# Patient Record
Sex: MALE | Race: WHITE | NOT HISPANIC OR LATINO | Employment: OTHER | URBAN - METROPOLITAN AREA
[De-identification: names, ages, dates, MRNs, and addresses within clinical notes are randomized per-mention and may not be internally consistent; named-entity substitution may affect disease eponyms.]

---

## 2016-05-27 LAB — CRC RECOMMENDATION EXT: NORMAL

## 2017-08-31 ENCOUNTER — HISTORICAL (OUTPATIENT)
Dept: ADMINISTRATIVE | Facility: HOSPITAL | Age: 72
End: 2017-08-31

## 2018-03-27 ENCOUNTER — HISTORICAL (OUTPATIENT)
Dept: ADMINISTRATIVE | Facility: HOSPITAL | Age: 73
End: 2018-03-27

## 2018-03-27 LAB
CHOLEST SERPL-MCNC: 194 MG/DL (ref 0–200)
CHOLEST/HDLC SERPL: 2.7 {RATIO}
CREAT/UREA NIT SERPL: 14.4
GGT SERPL-CCNC: 11 UNIT/L (ref 5–85)
HDLC SERPL-MCNC: 71 MG/DL (ref 35–60)
LDH SERPL-CCNC: 185 UNIT/L (ref 140–271)
LDLC SERPL CALC-MCNC: 75 MG/DL (ref 0–129)
TRIGL SERPL-MCNC: 126 MG/DL (ref 30–150)
VLDLC SERPL CALC-MCNC: 25.2 MG/DL

## 2018-09-19 ENCOUNTER — HISTORICAL (OUTPATIENT)
Dept: ADMINISTRATIVE | Facility: HOSPITAL | Age: 73
End: 2018-09-19

## 2018-10-18 ENCOUNTER — HISTORICAL (OUTPATIENT)
Dept: ADMINISTRATIVE | Facility: HOSPITAL | Age: 73
End: 2018-10-18

## 2018-10-23 ENCOUNTER — HISTORICAL (OUTPATIENT)
Dept: RESPIRATORY THERAPY | Facility: HOSPITAL | Age: 73
End: 2018-10-23

## 2018-12-18 ENCOUNTER — HISTORICAL (OUTPATIENT)
Dept: ADMINISTRATIVE | Facility: HOSPITAL | Age: 73
End: 2018-12-18

## 2019-04-03 ENCOUNTER — HISTORICAL (OUTPATIENT)
Dept: ADMINISTRATIVE | Facility: HOSPITAL | Age: 74
End: 2019-04-03

## 2020-06-04 ENCOUNTER — HISTORICAL (OUTPATIENT)
Dept: ADMINISTRATIVE | Facility: HOSPITAL | Age: 75
End: 2020-06-04

## 2020-06-04 LAB
BUN SERPL-MCNC: 16 MG/DL (ref 7–18)
CALCIUM SERPL-MCNC: 9.4 MG/DL (ref 8.5–10)
CHLORIDE SERPL-SCNC: 107 MMOL/L (ref 98–107)
CO2 SERPL-SCNC: 27 MMOL/L (ref 21–32)
CREAT SERPL-MCNC: 1.21 MG/DL (ref 0.6–1.3)
CREAT/UREA NIT SERPL: 13.2
GLUCOSE SERPL-MCNC: 117 MG/DL (ref 74–106)
POTASSIUM SERPL-SCNC: 4 MMOL/L (ref 3.5–5.1)
SODIUM SERPL-SCNC: 143 MMOL/L (ref 136–145)

## 2021-01-08 ENCOUNTER — HISTORICAL (OUTPATIENT)
Dept: HEMATOLOGY/ONCOLOGY | Facility: CLINIC | Age: 76
End: 2021-01-08

## 2021-04-08 ENCOUNTER — HISTORICAL (OUTPATIENT)
Dept: HEMATOLOGY/ONCOLOGY | Facility: CLINIC | Age: 76
End: 2021-04-08

## 2021-07-09 ENCOUNTER — HISTORICAL (OUTPATIENT)
Dept: HEMATOLOGY/ONCOLOGY | Facility: CLINIC | Age: 76
End: 2021-07-09

## 2022-02-04 ENCOUNTER — HISTORICAL (OUTPATIENT)
Dept: HEMATOLOGY/ONCOLOGY | Facility: CLINIC | Age: 77
End: 2022-02-04

## 2022-02-04 LAB
ABS NEUT (OLG): 3.42 (ref 2.1–9.2)
ALBUMIN SERPL-MCNC: 4 G/DL (ref 3.4–4.8)
ALBUMIN/GLOB SERPL: 1.8 {RATIO} (ref 1.1–2)
ALP SERPL-CCNC: 68 U/L (ref 40–150)
ALT SERPL-CCNC: 20 U/L (ref 0–55)
AST SERPL-CCNC: 20 U/L (ref 5–34)
BASOPHILS # BLD AUTO: 0 10*3/UL (ref 0–0.2)
BASOPHILS NFR BLD AUTO: 0.6 %
BILIRUB SERPL-MCNC: 0.8 MG/DL
BILIRUBIN DIRECT+TOT PNL SERPL-MCNC: 0.3 (ref 0–0.5)
BILIRUBIN DIRECT+TOT PNL SERPL-MCNC: 0.5 (ref 0–0.8)
BUN SERPL-MCNC: 11.7 MG/DL (ref 8.4–25.7)
CALCIUM SERPL-MCNC: 9.5 MG/DL (ref 8.7–10.5)
CHLORIDE SERPL-SCNC: 107 MMOL/L (ref 98–107)
CO2 SERPL-SCNC: 26 MMOL/L (ref 23–31)
CREAT SERPL-MCNC: 1.17 MG/DL (ref 0.73–1.18)
EOSINOPHIL # BLD AUTO: 0.2 10*3/UL (ref 0–0.9)
EOSINOPHIL NFR BLD AUTO: 2.4 %
ERYTHROCYTE [DISTWIDTH] IN BLOOD BY AUTOMATED COUNT: 13 % (ref 11.5–17)
GLOBULIN SER-MCNC: 2.2 G/DL (ref 2.4–3.5)
GLUCOSE SERPL-MCNC: 82 MG/DL (ref 82–115)
HCT VFR BLD AUTO: 47.5 % (ref 42–52)
HEMOLYSIS INTERF INDEX SERPL-ACNC: 4
HGB BLD-MCNC: 15.9 G/DL (ref 14–18)
ICTERIC INTERF INDEX SERPL-ACNC: 1
LDH SERPL-CCNC: 198 U/L (ref 140–271)
LIPEMIC INTERF INDEX SERPL-ACNC: 2
LYMPHOCYTES # BLD AUTO: 3.6 10*3/UL (ref 0.6–4.6)
LYMPHOCYTES NFR BLD AUTO: 46 %
MANUAL DIFF? (OHS): NO
MCH RBC QN AUTO: 29.4 PG (ref 27–31)
MCHC RBC AUTO-ENTMCNC: 33.5 G/DL (ref 33–36)
MCV RBC AUTO: 88 FL (ref 80–94)
MONOCYTES # BLD AUTO: 0.6 10*3/UL (ref 0.1–1.3)
MONOCYTES NFR BLD AUTO: 7.1 %
NEUTROPHILS # BLD AUTO: 3.4 10*3/UL (ref 2.1–9.2)
NEUTROPHILS NFR BLD AUTO: 43.6 %
PLATELET # BLD AUTO: 185 10*3/UL (ref 130–400)
PMV BLD AUTO: 9.9 FL (ref 9.4–12.4)
POTASSIUM SERPL-SCNC: 4.1 MMOL/L (ref 3.5–5.1)
PROT SERPL-MCNC: 6.2 G/DL (ref 5.8–7.6)
RBC # BLD AUTO: 5.4 10*6/UL (ref 4.7–6.1)
SODIUM SERPL-SCNC: 143 MMOL/L (ref 136–145)
WBC # SPEC AUTO: 7.8 10*3/UL (ref 4.5–11.5)

## 2022-04-07 ENCOUNTER — HISTORICAL (OUTPATIENT)
Dept: ADMINISTRATIVE | Facility: HOSPITAL | Age: 77
End: 2022-04-07
Payer: MEDICARE

## 2022-04-23 VITALS
DIASTOLIC BLOOD PRESSURE: 84 MMHG | WEIGHT: 181.69 LBS | OXYGEN SATURATION: 92 % | SYSTOLIC BLOOD PRESSURE: 126 MMHG | BODY MASS INDEX: 26.01 KG/M2 | HEIGHT: 70 IN

## 2022-05-01 NOTE — HISTORICAL [facility]
This is a historical note converted from [employer]. Formatting and pictures may have been removed.  Please reference Arina for original formatting and attached multimedia. Chief Complaint  right shoulder pain  History of Present Illness  Last week patient?developed acute right shoulder pain?during performing?a plank. ?Persistent right?shoulder pain with overhead activities.? Full range of motion.? Denies any radicular symptoms, weakness or numbness.  Review of Systems  Constitutional:?No weight loss, no fever, no fatigue, no chills, no night sweats,?no weakness  Eyes:?No blurred vision,?no redness,?no drainage,?no ocular?pain  HEENT:?No sore throat,?no ear pain, no sinus pressure, no nasal congestion, no rhinorrhea, no postnasal drip  Respiratory:?No cough, no wheezing, no sputum production, no shortness of breath  Cardiovascular:?No chest pain, no palpitations, no dyspnea on exertion,?no orthopnea  Gastrointestinal:?No nausea, no vomiting, no abdominal pain, no diarrhea,?no constipation, no melena,?no hematochezia  Genitourinary:?No dysuria, no hematuria, no frequency, no urgency, no incontinence, no discharge  Musculoskeletal:?myalgias, arthralgias, no weakness, no joint effusion, no edema  Integumentary:?No rashes, no hives, no itching, no lesions, no jaundice  Neurologic:?No headaches, no numbness, no tingling, no weakness, no dizziness  ?  Physical Exam  Vitals & Measurements  HR:?70(Peripheral)? BP:?146/92?  HT:?175?cm? WT:?82.4?kg? BMI:?26.91?  General:?Well developed, Well-nourished, in No acute distress, A&O x 4  Cardiovascular:?Regular rate and rhythm, No murmurs, No gallops, No rubs  Respiratory:?Clear to auscultation bilaterally, No wheezes, No rhonchi, No?crackles  Abdomen:? Soft, Nontender, No hepatosplenomegaly, Normal and equal bowel sounds, No masses, No rebound, No guarding  Right?Shoulder -?Positive?Lomas,?Positive?Neers,?Positive?Empty can, Strength?4/5,?Full?ROM, No pain with cross body  adduction, ttp over AC joint  Assessment/Plan  1.?Right rotator cuff tendonitis  ?X-ray right shoulder-degenerative joint disease AC joint  Discuss treatment options at length. ?Patient elects to proceed with cortical steroid injection. ?Kenalog 40 mg and lidocaine 1% 4 mL used for subacromial injection.? Patient tolerated well without any complications.  Handout given with range of motion exercises  Ordered:  asp/inj jnt/bursa, major [id number] PC, 04/03/19 11:32:00 CDT, [facility], 04/03/19 11:32:00 CDT  Office/Outpatient Visit Level 3 Established [id number] PC, Right rotator cuff tendonitis, [facility], 04/03/19 11:32:00 CDT  ?   Problem List/Past Medical History  Ongoing  Allergic rhinitis  Diverticulosis  Hearing loss  Prostate cancer  Wellness examination  Historical  Cough  None  Procedure/Surgical History  Bronchoscopy w/ Endobronch Biopsy(s) (.) (10/18/2018)  Bronchoscopy with Brushings (.) (10/18/2018)  Bronchoscopy, Diagnostic (.) (10/18/2018)  Bronchoscopy, rigid or flexible, including fluoroscopic guidance, when performed; with bronchial or endobronchial biopsy(s), single or multiple sites (10/18/2018)  Excision of Left Main Bronchus, Via Natural or Artificial Opening Endoscopic, Diagnostic (10/18/2018)  Colonoscopy (05/27/2016)  Prostatectomy (2006)  SALIVARY GLAND REMOVED (1995)   Medications  metoprolol succinate 25 mg oral tablet, extended release, 25 mg= 1 tab(s), Oral, Daily  sildenafil 25 mg oral tablet, 25 mg= 1 tab(s), Oral, Daily  Zyrtec 10 mg oral tablet, 10 mg= 1 tab(s), Oral, Daily  Allergies  Dulcolax Stool Softener?(muscle contractions)  sulfa drugs?(migraine)  Social History  Alcohol  Current, 1-2 times per month, 08/23/2016  Employment/School  Retired, 03/27/2018  Exercise  Exercise duration: 60. Exercise frequency: 3-4 times/week. Self assessment: Excellent condition. Exercise type: Swimming, Yoga., 08/23/2016  Home/Environment  Lives with Spouse. Living situation: Home/Independent.,  03/27/2018  Nutrition/Health  Regular, 09/22/2015  Substance Abuse  Never, 04/13/2015  Tobacco  Never (less than 100 in lifetime), No, 04/03/2019  Never smoker, N/A, 12/18/2018  Never smoker, N/A, 11/23/2018  Never smoker, 04/13/2015  Family History  Diabetes mellitus type 2: Father.  Heart failure.: Father.  Hypertension.: Father.  Non-Hodgkin lymphoma.: Mother.  Immunizations  Vaccine Date Status Comments   pneumococcal 13-valent conjugate vaccine 03/27/2018 Given    influenza virus vaccine, inactivated 12/10/2016 Recorded [facility]   Health Maintenance  Health Maintenance  ???Pending?(in the next year)  ??? ??OverDue  ??? ? ? ?Pneumococcal Vaccine due??and every?  ??? ? ? ?Aspirin Therapy for CVD Prevention due??05/10/18??and every 1??year(s)  ??? ? ? ?Zoster Vaccine due??05/10/18??and every 100??year(s)  ??? ? ? ?Tetanus Vaccine due??08/31/18??and every 10??year(s)  ??? ? ? ?Alcohol Misuse Screening due??08/31/18??and every 1??year(s)  ??? ? ? ?Advance Directive due??02/20/19??and every 1??year(s)  ??? ??Due?  ??? ? ? ?Cognitive Screening due??04/03/19??and every 1??year(s)  ??? ? ? ?Functional Assessment due??04/03/19??and every 1??year(s)  ??? ? ? ?Geriatric Depression Screening due??04/03/19??and every 1??year(s)  ??? ??Due In Future?  ??? ? ? ?ADL Screening not due until??11/23/19??and every 1??year(s)  ??? ? ? ?Fall Risk Assessment not due until??11/26/19??and every 1??year(s)  ???Satisfied?(in the past 1 year)  ??? ??Satisfied?  ??? ? ? ?ADL Screening on??11/23/18.??Satisfied by Antione Lewis RN  ??? ? ? ?Blood Pressure Screening on??04/03/19.??Satisfied by Meenakshi Arenas LPN  ??? ? ? ?Body Mass Index Check on??04/03/19.??Satisfied by Meenakshi Arenas LPN  ??? ? ? ?Diabetes Screening on??11/26/18.??Satisfied by Padmaja Holder  ??? ? ? ?Fall Risk Assessment on??11/26/18.??Satisfied by Sharona Coyle RN  ??? ? ? ?Influenza Vaccine on??12/18/18.??Satisfied by Meenakshi Arenas LPN  ??? ? ?  ?Obesity Screening on??04/03/19.??Satisfied by Meenakshi Arenas LPN  ?  ?  Diagnostic Results  (04/03/2019 10:59 CDT XR Shoulder Right Minimum 2 Views)  ?  Reason For Exam  Pain  ?  Radiology Report  Diagnosis: Shoulder pain  ?  Right shoulder, 2 views:  ?  A.C. joint: Mild to moderate hypertrophic osteoarthritic degenerative changes  with osteophyte formation that could create rotator cuff impingement. MRI may  be of some benefit.  ?  Glenohumeral joint: Normal for age with no significant spur formation or joint  space narrowing. No subchondral cyst formation.  ?  Bone structures: No evidence of fracture, lytic lesion or avascular necrosis.  Scapula is unremarkable. Right upper rib detail is normal.  ?  Soft tissues: No abnormal calcifications or mass lesion. Upper lung field is  clear.  ?  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  ? [1]     [1]?XR Shoulder Right Minimum 2 Views; Fifi AVITIA, Henok CAMILO 04/03/2019 10:59 CDT

## 2022-05-01 NOTE — HISTORICAL [facility]
This is a historical note converted from Arina. Formatting and pictures may have been removed.  Please reference Arina for original formatting and attached multimedia. Chief Complaint  bump on right wrist  History of Present Illness  Patient presents for evaluation of?localized swelling?right wrist?dorsal?ulnar aspect.? Initially a soft?nodule noted now has become?firmer. ?Present for 1 year.???Minimal discomfort associated.  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 On 11/26/2018 patient was noted?to have A. fib?with RVR?for 1 hour which spontaneously converted to normal sinus rhythm.? Denies any palpitations or shortness of breath since.? Denies any symptoms?during?episode.? Denies any previous history of A. fib.  Review of Systems  Constitutional:?No weight loss, no fever, no fatigue, no chills, no night sweats,?no weakness  Eyes:?No blurred vision,?no redness,?no drainage,?no ocular?pain  HEENT:?No sore throat,?no ear pain, no sinus pressure, no nasal congestion, no rhinorrhea, no postnasal drip  Respiratory:?No cough, no wheezing, no sputum production, no shortness of breath  Cardiovascular:?No chest pain, no palpitations, no dyspnea on exertion,?no orthopnea  Gastrointestinal:?No nausea, no vomiting, no abdominal pain, no diarrhea,?no constipation, no melena,?no hematochezia  Genitourinary:?No dysuria, no hematuria, no frequency, no urgency, no incontinence, no discharge  Musculoskeletal:?No myalgias, no arthralgias, no weakness, no joint effusion, edema  Integumentary:?No rashes, no hives, no itching, no lesions, no jaundice  Neurologic:?No headaches, no numbness, no tingling, no weakness, no dizziness  ?   ?  Physical Exam  Vitals & Measurements  HR:?70(Peripheral)? BP:?138/92?  HT:?175?cm? HT:?175?cm? WT:?82.5?kg? WT:?82.5?kg? BMI:?26.94?  General:?Well developed, Well-nourished, in No acute distress, A&O x  4  Cardiovascular:?Regular rate and rhythm, No murmurs, No gallops, No rubs  Respiratory:?Scattered expiratory wheezes?left lower lung field, No rhonchi, No?crackles  Abdomen:? Soft, Nontender, No hepatosplenomegaly, Normal and equal bowel sounds, No masses, No rebound, No guarding  Musculoskeletal:? No tenderness, FROM, No joint abnormality, No clubbing, No cyanosis,No?edema  Integumentary:?Firm cystic nodule?right?dorsal?ulnar?wrist  Assessment/Plan  1.?Mass of right wrist?R22.31  ?X-ray right wrist-unremarkable for any isra abnormality, soft tissue nodule noted  Consistent with ganglion cyst  Discussed treatment options at length. ?Patient elects to proceed with?aspiration and injection.? Lidocaine 1% with epi used for local anesthesia.? 18-gauge needle used for aspiration?removing?2 mL of?gelatinous?fluid.? 20 mg of Kenalog injected into?the cyst cavity. ?Patient tolerated well without any?complications.  Ordered:  Injections Tendon Sheath/lig [id number] PC, 12/18/18 11:15:00 CST, [facility], 12/18/18 11:15:00 CST  Office/Outpatient Visit Level 3 Established [id number] PC, Mass of right wrist  Afib, [facility], 12/18/18 11:05:00 CST  ?  2.?Afib?I48.91  Ordered:  External Referral, DANE escudero, Cardiology, [facility], 12/18/18 11:05:00 CST, Afib  Office/Outpatient Visit Level 3 Established [id number] PC, Mass of right wrist  Afib, [facility], 12/18/18 11:05:00 CST  ?   Problem List/Past Medical History  Ongoing  Allergic rhinitis  Diverticulosis  Hearing loss  Prostate cancer  Wellness examination  Historical  Cough  None  Procedure/Surgical History  Bronchoscopy w/ Endobronch Biopsy(s) (.) (10/18/2018)  Bronchoscopy with Brushings (.) (10/18/2018)  Bronchoscopy, Diagnostic (.) (10/18/2018)  Colonoscopy (05/27/2016)  Prostatectomy (2006)  SALIVARY GLAND REMOVED (1995)   Medications  gabapentin 300 mg oral capsule, 300 mg= 1 cap(s), Oral, TID  sildenafil 25 mg oral tablet, 25 mg= 1 tab(s),  Oral, Daily  traMADol 50 mg oral tablet, 50 mg= 1 tab(s), Oral, q4hr, PRN  Zyrtec 10 mg oral tablet, 10 mg= 1 tab(s), Oral, Daily  Allergies  Dulcolax Stool Softener?(muscle contractions)  sulfa drugs?(migraine)  Social History  Alcohol  Current, 1-2 times per month, 08/23/2016  Employment/School  Retired, 03/27/2018  Exercise  Exercise duration: 60. Exercise frequency: 3-4 times/week. Self assessment: Excellent condition. Exercise type: Swimming, Yoga., 08/23/2016  Home/Environment  Lives with Spouse. Living situation: Home/Independent., 03/27/2018  Nutrition/Health  Regular, 09/22/2015  Substance Abuse  Never, 04/13/2015  Tobacco  Never smoker, N/A, 12/18/2018  Never smoker, N/A, 11/23/2018  Never smoker, 04/13/2015  Family History  Diabetes mellitus type 2: Father.  Heart failure.: Father.  Hypertension.: Father.  Non-Hodgkin lymphoma.: Mother.  Immunizations  Vaccine Date Status Comments   pneumococcal 13-valent conjugate vaccine 03/27/2018 Given    influenza virus vaccine, inactivated 12/10/2016 Recorded [facility]   Health Maintenance  Health Maintenance  ???Pending?(in the next year)  ??? ??OverDue  ??? ? ? ?Pneumococcal Vaccine due??and every?  ??? ? ? ?Aspirin Therapy for CVD Prevention due??05/10/18??and every 1??year(s)  ??? ? ? ?Zoster Vaccine due??05/10/18??and every 100??year(s)  ??? ? ? ?Tetanus Vaccine due??08/31/18??and every 10??year(s)  ??? ? ? ?Alcohol Misuse Screening due??08/31/18??and every 1??year(s)  ??? ??Due?  ??? ? ? ?Cognitive Screening due??12/18/18??and every 1??year(s)  ??? ? ? ?Functional Assessment due??12/18/18??and every 1??year(s)  ??? ? ? ?Geriatric Depression Screening due??12/18/18??and every 1??year(s)  ??? ??Due In Future?  ??? ? ? ?Advance Directive not due until??02/20/19??and every 1??year(s)  ??? ? ? ?ADL Screening not due until??11/23/19??and every 1??year(s)  ??? ? ? ?Fall Risk Assessment not due until??11/26/19??and every 1??year(s)  ???Satisfied?(in the past 1  year)  ??? ??Satisfied?  ??? ? ? ?ADL Screening on??11/23/18.??Satisfied by Antione Lewis RN  ??? ? ? ?Advance Directive on??02/20/18.??Satisfied by Amada Power  ??? ? ? ?Blood Pressure Screening on??12/18/18.??Satisfied by Meenakshi Arenas LPN  ??? ? ? ?Body Mass Index Check on??12/18/18.??Satisfied by Meenakshi Arenas LPN  ??? ? ? ?Depression Screening on??02/20/18.??Satisfied by Amada Power  ??? ? ? ?Diabetes Screening on??11/26/18.??Satisfied by Padmaja Holder  ??? ? ? ?Fall Risk Assessment on??11/26/18.??Satisfied by Sharona Coyle RN  ??? ? ? ?Influenza Vaccine on??12/18/18.??Satisfied by Meenakshi Arenas LPN  ??? ? ? ?Lipid Screening on??03/27/18.??Satisfied by Jono Robles  ??? ? ? ?Obesity Screening on??12/18/18.??Satisfied by Meenakshi Arenas LPN  ??? ? ? ?Pneumococcal Vaccine on??03/27/18.??Satisfied by Meenakshi Arenas LPN  ??? ??Postponed?  ??? ? ? ?Influenza Vaccine on??02/20/18.??Recorded by Amada Power??Reason: Postpone due to refusal  ?  ?  Diagnostic Results  (12/18/2018 11:05 CST XR Wrist Right Minimum 3 Views)  Reason For Exam  Pain  ?  Radiology Report  Diagnosis:  ?  Palpable nodule  ?  Right wrist, 3 views:  ?  Positive for a prominent soft tissue swelling surrounding the distal ulnar  styloid process.  ?  Most likely representing ganglion cyst.  ?  No internal calcifications and no cortical irregularities of the surrounding  bone structure.  ?  Recommend correlation with MRI for confirmation.  ?  Negative for fracture or lytic lesion.  ?  Impression: Soft tissue swelling emanating from the region of the distal ulnar  styloid process.  ?  Signature Line  Electronically Signed By: Fifi AVITIA, Henok CAMILO  Date/Time Signed: 12/18/2018 11:15  ? [1]

## 2022-05-01 NOTE — HISTORICAL [facility]
This is a historical note converted from [employer]. Formatting and pictures may have been removed.  Please reference Arina for original formatting and attached multimedia. Chief Complaint  np establish  History of Present Illness  73 year old male  BP WNL?patient reports occasional blood pressure elevation at home. ?Consistently within normal limits?at previous PCPs office.  Cardio 3 x weekly  yoga 5 x weekly  Sees Dr. Felix Berman yearly, history of prosate CA  colonoscopy 2017 unremarkable, Dr. Riley  Denies history of pneumonia or shingles vaccine.  Denies any labs?since 2015.  Non-smoker  Review of Systems  Constitutional:?No weight loss, no fever, no fatigue, no chills, no night sweats,?no weakness  Eyes:?No blurred vision,?no redness,?no drainage,?no ocular?pain  HEENT:?No sore throat,?no ear pain, no sinus pressure, no nasal congestion, no rhinorrhea, no postnasal drip  Respiratory:?No cough, no wheezing, no sputum production, no shortness of breath  Cardiovascular:?No chest pain, no palpitations, no dyspnea on exertion,?no orthopnea  Gastrointestinal:?No nausea, no vomiting, no abdominal pain, no diarrhea,?no constipation, no melena,?no hematochezia  Genitourinary:?No dysuria, no hematuria, no frequency, no urgency, no incontinence, no discharge  Musculoskeletal:?No myalgias, no arthralgias, no weakness, no joint effusion, no edema  Integumentary:?No rashes, no hives, no itching, no lesions, no jaundice  Neurologic:?No headaches, no numbness, no tingling, no weakness, no dizziness  Psychiatric:?No anxiety, no irritability, no depression,?no suicidal ideations, no?homicidal ideations,?no delusions, no hallucinations  Endocrine:?No polyuria, no polydipsia, no polyphagia  Hematology:?No bruising, no lymphadenopathy,?no paleness  ?  Physical Exam  Vitals & Measurements  HR:?60(Peripheral)? BP:?134/90?  HT:?175.26?cm? HT:?175.26?cm? WT:?80.5?kg? WT:?80.5?kg? BMI:?26.21?  General:?Well developed,  Well-nourished, in No acute distress, A&O x 4  Eye:?PERRLA, EOMI, Clear conjunctiva, Eyelids unremarkable  Ears:?Bilateral EAC clear?and?Bilateral TM clear  Nose:? Mucosa normal, No rhinorrhea, No lesions  O/P:??No?erythema,?No tonsillar hypertrophy,?No tonsillar exudates,?No cobblestoning  Neck:?Soft, Nontender, No thyromegaly, Full range of motion, No cervical lymphadenopathy, No JVD  Cardiovascular:?Regular rate and rhythm, No murmurs, No gallops, No rubs  Respiratory:?Clear to auscultation bilaterally, No wheezes, No rhonchi, No?crackles  Abdomen:? Soft, Nontender, No hepatosplenomegaly, Normal and equal bowel sounds, No masses, No rebound, No guarding  Musculoskeletal:? No tenderness, FROM, No joint abnormality, No clubbing, No cyanosis,No?edema  Neurologic:? No motor or sensory deficits, Reflexes 2+ throughout, CN II-XII grossly intact  Integumentary:?No rashes or skin lesions  Assessment/Plan  1.?Wellness examination  ?FLP  Continue balanced diet and regular exercise  Prevnar?IM today  Ordered:  [facility] New 65 yrs and over [id number] PC, Wellness examination  Elevated BP without diagnosis of hypertension  Lipid screening, [facility], 03/27/18 10:56:00 CDT  ?  2.?Elevated BP without diagnosis of hypertension  ?CBC, CMP  Continue to monitor blood pressure regularly  Ordered:  [facility] New 65 yrs and over [id number] PC, Wellness examination  Elevated BP without diagnosis of hypertension  Lipid screening, [facility], 03/27/18 10:56:00 CDT  ?  3.?Lipid screening  ?FLP today  Ordered:  [facility] New 65 yrs and over [id number] PC, Wellness examination  Elevated BP without diagnosis of hypertension  Lipid screening, [facility], 03/27/18 10:56:00 CDT  ?   Problem List/Past Medical History  Ongoing  Allergic rhinitis  Diverticulosis  Hearing loss  Prostate cancer  Wellness examination  Historical  Cough  None  Procedure/Surgical History  Colonoscopy (05/27/2016),  Prostatectomy (2006), SALIVARY GLAND REMOVED (1995).  Medications  CALCIUM  multivitamin with minerals (Adult Tab), 1 tab(s), Oral, Daily  sildenafil 25 mg oral tablet, 25 mg= 1 tab(s), Oral, Daily  Allergies  sulfa drugs  Social History  Alcohol  Current, 1-2 times per month, 08/23/2016  Employment/School  Retired, 03/27/2018  Exercise  Exercise duration: 60. Exercise frequency: 3-4 times/week. Self assessment: Excellent condition. Exercise type: Swimming, Yoga., 08/23/2016  Home/Environment  Lives with Spouse. Living situation: Home/Independent., 03/27/2018  Nutrition/Health  Regular, 09/22/2015  Substance Abuse  Never, 04/13/2015  Tobacco  Never smoker, 04/13/2015  Family History  Diabetes mellitus type 2: Father.  Heart failure.: Father.  Hypertension.: Father.  Non-Hodgkin lymphoma.: Mother.  Immunizations  Vaccine Date Status Comments   pneumococcal 13-valent conjugate vaccine 03/27/2018 Given    influenza virus vaccine, inactivated 12/10/2016 Recorded [facility]

## 2022-05-01 NOTE — HISTORICAL [facility]ER
This is a historical note converted from Arina. Formatting and pictures may have been removed.  Please reference Arina for original formatting and attached multimedia. Chief Complaint  cough for a while now, coughed up blood last night  History of Present Illness  Patient presents with?complaints of chronic cough. ?He had an episode of hemoptysis last year.? He was seen by his PCP at that time. ?Chest x-ray was unremarkable.? Reports chronic?nasal congestion, rhinorrhea and postnasal drip.? Intermittent response to?antihistamines and nasal corticosteroids. ?Patient saw ENT beginning of this year. ?No significant abnormality noted on that evaluation.? Patient recommended to continue allergy treatments.? Yesterday patient developed?persistent cough?productive of?blood and clear mucus. ?Denies any fever, chills, shortness of breath, weight loss, night sweats.? No previous history of smoking.? Patient leaving for vacation?to [place] for 10 days tomorrow.  Review of Systems  Constitutional:?No weight loss, no fever, no fatigue, no chills, no night sweats,?no weakness  Eyes:?No blurred vision,?no redness,?no drainage,?no ocular?pain  HEENT:?No sore throat,?no ear pain, no sinus pressure, nasal congestion, rhinorrhea, postnasal drip  Respiratory:?No cough, no wheezing, sputum production, no shortness of breath, hemoptysis  Cardiovascular:?No chest pain, no palpitations, no dyspnea on exertion,?no orthopnea  Gastrointestinal:?No nausea, no vomiting, no abdominal pain, no diarrhea,?no constipation, no melena,?no hematochezia  Genitourinary:?No dysuria, no hematuria, no frequency, no urgency, no incontinence, no discharge  Musculoskeletal:?No myalgias, no arthralgias, no weakness, no joint effusion, no edema  Integumentary:?No rashes, no hives, no itching, no lesions, no jaundice  Neurologic:?No headaches, no numbness, no tingling, no weakness, no dizziness  Psychiatric:?No anxiety, no irritability, no depression,?no  suicidal ideations, no?homicidal ideations,?no delusions, no hallucinations  Endocrine:?No polyuria, no polydipsia, no polyphagia  Hematology:?No bruising, no lymphadenopathy,?no paleness  ?  Physical Exam  Vitals & Measurements  HR:?80(Peripheral)? BP:?126/100?  HT:?175?cm? HT:?175?cm? WT:?80.2?kg? WT:?80.2?kg? BMI:?26.19?  General:?Well developed, Well-nourished, in No acute distress, A&O x 4  Eye:?PERRLA, EOMI, Clear conjunctiva, Eyelids unremarkable  Ears:?Bilateral EAC clear?and?Bilateral TM clear  Nose:? Mucosa boggy, No rhinorrhea, No lesions  O/P:??No?erythema,?No tonsillar hypertrophy,?No tonsillar exudates,?cobblestoning  Neck:?Soft, Nontender, No thyromegaly, Full range of motion, No cervical lymphadenopathy, No JVD  Cardiovascular:?Regular rate and rhythm, No murmurs, No gallops, No rubs  Respiratory:?Clear to auscultation bilaterally, No wheezes, No rhonchi, No?crackles  Abdomen:? Soft, Nontender, No hepatosplenomegaly, Normal and equal bowel sounds, No masses, No rebound, No guarding  Musculoskeletal:? No tenderness, FROM, No joint abnormality, No clubbing, No cyanosis,No?edema  Neurologic:? No motor or sensory deficits, Reflexes 2+ throughout, CN II-XII grossly intact  Integumentary:?No rashes or skin lesions  ?  Assessment/Plan  1.?Allergic rhinitis  ?Start Singulair 10 mg daily  Ordered:  Office/Outpatient Visit Level 4 Established [id number] PC, Allergic rhinitis  Hemoptysis, [facility], 09/19/18 17:01:00 CDT  ?  2.?Hemoptysis  ?Checks x-ray-unremarkable for any consolidation or effusion?or mass.? Calcification of aortic knob.  We will schedule CT of the chest to further evaluate  Tussionex 5 mL every 12 hours as needed cough  Patient develops?productive cough, fever,?purulent rhinorrhea recommend starting doxycycline  If hemoptysis persist or worsen recommend reporting to the nearest ER for further evaluation  Ordered:  Office/Outpatient Visit Level 4 Established [id number] PC, Allergic rhinitis   Hemoptysis, [facility], 09/19/18 17:01:00 CDT  Schedule Diagnostics Study, CT Chest without contrast, No Oral Contrast Requested, Pt returns from trip 9/29/18, 09/19/18 17:01:00 CDT, Hemoptysis  XR Chest 2 Views, Routine, 09/19/18 16:35:00 CDT, Other (please specify), None, Ambulatory, Rad Type, Hemoptysis, [facility], 09/19/18 16:35:00 CDT  ?  Orders:  chlorpheniramine-hydrocodone, 5 mL, Oral, q12hr, PRN PRN as needed for cough, X 7 day(s), # 120 mL, 0 Refill(s), Pharmacy: [id number]  doxycycline, 100 mg = 1 cap(s), Oral, BID, X 7 day(s), # 14 cap(s), 0 Refill(s), Pharmacy: [id number]  montelukast, 10 mg = 1 tab(s), Oral, Daily, # 30 tab(s), 5 Refill(s), Pharmacy: [id number]   Problem List/Past Medical History  Ongoing  Allergic rhinitis  Diverticulosis  Hearing loss  Prostate cancer  Wellness examination  Historical  Cough  None  Procedure/Surgical History  Colonoscopy (05/27/2016)  Prostatectomy (2006)  SALIVARY GLAND REMOVED (1995)   Medications  CALCIUM  doxycycline monohydrate 100 mg oral capsule, 100 mg= 1 cap(s), Oral, BID  multivitamin with minerals (Adult Tab), 1 tab(s), Oral, Daily  sildenafil 25 mg oral tablet, 25 mg= 1 tab(s), Oral, Daily  Singulair 10 mg oral TABLET, 10 mg= 1 tab(s), Oral, Daily, 5 refills  Tussionex [employer] 10 mg-8 mg/5 mL oral suspension, extended release, 5 mL, Oral, q12hr, PRN  Allergies  sulfa drugs  Social History  Alcohol  Current, 1-2 times per month, 08/23/2016  Employment/School  Retired, 03/27/2018  Exercise  Exercise duration: 60. Exercise frequency: 3-4 times/week. Self assessment: Excellent condition. Exercise type: Swimming, Yoga., 08/23/2016  Home/Environment  Lives with Spouse. Living situation: Home/Independent., 03/27/2018  Nutrition/Health  Regular, 09/22/2015  Substance Abuse  Never, 04/13/2015  Tobacco  Never smoker, 04/13/2015  Family History  Diabetes mellitus type 2: Father.  Heart failure.:  Father.  Hypertension.: Father.  Non-Hodgkin lymphoma.: Mother.  Immunizations  Vaccine Date Status Comments   pneumococcal 13-valent conjugate vaccine 03/27/2018 Given    influenza virus vaccine, inactivated 12/10/2016 Recorded [facility]   Health Maintenance  Health Maintenance  ???Pending?(in the next year)  ??? ??OverDue  ??? ? ? ?Pneumococcal Vaccine due??and every?  ??? ? ? ?Aspirin Therapy for CVD Prevention due??05/10/18??and every 1??year(s)  ??? ? ? ?Zoster Vaccine due??05/10/18??and every 100??year(s)  ??? ??Due?  ??? ? ? ?Tetanus Vaccine due??08/31/18??and every 10??year(s)  ??? ? ? ?Alcohol Misuse Screening due??08/31/18??and every 1??year(s)  ??? ? ? ?ADL Screening due??09/19/18??and every 1??year(s)  ??? ? ? ?Abdominal Aortic Aneurysm Screening due??09/19/18??and every 100??year(s)  ??? ? ? ?Cognitive Screening due??09/19/18??and every 1??year(s)  ??? ? ? ?Fall Risk Assessment due??09/19/18??and every 1??year(s)  ??? ? ? ?Functional Assessment due??09/19/18??and every 1??year(s)  ??? ? ? ?Geriatric Depression Screening due??09/19/18??and every 1??year(s)  ??? ??Due In Future?  ??? ? ? ?Advance Directive not due until??02/20/19??and every 1??year(s)  ???Satisfied?(in the past 1 year)  ??? ??Satisfied?  ??? ? ? ?Advance Directive on??02/20/18.??Satisfied by Amada Power  ??? ? ? ?Blood Pressure Screening on??09/19/18.??Satisfied by Meenakshi Arenas  ??? ? ? ?Body Mass Index Check on??09/19/18.??Satisfied by Meenakshi Arenas  ??? ? ? ?Depression Screening on??02/20/18.??Satisfied by Amada Power  ??? ? ? ?Diabetes Screening on??03/27/18.??Satisfied by Jono Robles  ??? ? ? ?Influenza Vaccine on??09/19/18.??Satisfied by Meenakshi Arenas  ??? ? ? ?Lipid Screening on??03/27/18.??Satisfied by Jono Robles  ??? ? ? ?Obesity Screening on??09/19/18.??Satisfied by Meenakshi Arenas  ??? ? ? ?Pneumococcal Vaccine on??03/27/18.??Satisfied by Meenakshi Arenas  ??? ??Postponed?  ??? ? ?  ?Influenza Vaccine until??02/20/18.??Recorded for Amada Power??Reason: Postpone due to refusal  ?  ?

## 2022-06-03 ENCOUNTER — TELEPHONE (OUTPATIENT)
Dept: HEMATOLOGY/ONCOLOGY | Facility: CLINIC | Age: 77
End: 2022-06-03
Payer: MEDICARE

## 2022-06-03 NOTE — TELEPHONE ENCOUNTER
CT CAP with contrast @ [facility] on Thursday, 9/8/22 at 9:00am. Their office has confirmed appt with pt.

## 2022-08-08 ENCOUNTER — PATIENT OUTREACH (OUTPATIENT)
Dept: ADMINISTRATIVE | Facility: HOSPITAL | Age: 77
End: 2022-08-08
Payer: MEDICARE

## 2022-08-08 NOTE — PROGRESS NOTES
Population Health Outreach.Records Received, hyper-linked into chart at this time. The following record(s)  below were uploaded for Health Maintenance .    5/27/2016-COLONOSCOPY

## 2022-08-23 PROBLEM — J93.9 PNEUMOTHORAX: Status: ACTIVE | Noted: 2018-11-27

## 2022-08-23 PROBLEM — K21.9 GASTROESOPHAGEAL REFLUX DISEASE: Status: ACTIVE | Noted: 2018-11-13

## 2022-08-23 PROBLEM — D72.820 MONOCLONAL B-CELL LYMPHOCYTOSIS: Status: ACTIVE | Noted: 2022-08-23

## 2022-08-23 PROBLEM — H91.90 HEARING LOSS: Status: ACTIVE | Noted: 2017-05-01

## 2022-08-23 PROBLEM — J30.9 ALLERGIC RHINITIS: Status: ACTIVE | Noted: 2022-08-23

## 2022-08-23 PROBLEM — D3A.090 CARCINOID TUMOR OF LUNG: Status: ACTIVE | Noted: 2022-08-23

## 2022-08-23 PROBLEM — H93.19 TINNITUS: Status: ACTIVE | Noted: 2022-08-23

## 2022-08-23 PROBLEM — D30.01 RENAL ONCOCYTOMA OF RIGHT KIDNEY: Status: ACTIVE | Noted: 2022-08-23

## 2022-08-23 PROBLEM — Z90.2 HISTORY OF LOBECTOMY OF LUNG: Status: ACTIVE | Noted: 2022-08-23

## 2022-08-23 PROBLEM — K57.90 DIVERTICULAR DISEASE: Status: ACTIVE | Noted: 2022-08-23

## 2022-08-23 PROBLEM — N52.9 ERECTILE DYSFUNCTION: Status: ACTIVE | Noted: 2022-08-23

## 2022-08-23 PROBLEM — C34.32 MALIGNANT NEOPLASM OF LOWER LOBE, LEFT BRONCHUS OR LUNG: Status: ACTIVE | Noted: 2018-11-01

## 2022-09-13 DIAGNOSIS — D72.820 MONOCLONAL B-CELL LYMPHOCYTOSIS: Primary | ICD-10-CM

## 2022-09-13 NOTE — PROGRESS NOTES
Subjective:       Patient ID: Robert Paniagua is a 77 y.o. male.    Chief Complaint: Follow-up (Patient stated no new problems )      Diagnosis:  1.  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  3.  Renal oncocytoma    Current Treatment:   Observation for monoclonal B-cell lymphocytosis.    Treatment History:  1.    HPI  Patient presented in December 2017 with hemoptysis at [facility].  A chest x-ray at that time was negative.  He then developed hemoptysis again in August 2018 and a chest x-ray revealed increasing interstitial markings in the left lower hilar region with possible abnormal enlargement in the left hilum.  A CT of the chest on 10/9/2018 without contrast showed an irregular endobronchial lesion in the left main bronchus to the left lower lobe takeoff.  It measured 1.9 cm.  There was atelectasis involving the anterior medial segment of the left lower lobe.  There was no focal pulmonary nodule.  There were few scattered nonenlarged mediastinal lymph nodes.  Bronchoscopy on 10/24/2018 with a biopsy of the lesion in the left mainstem revealed adenocarcinoma.  Brushings and washings were negative for malignancy.       PET/CT scan on 11/7/2018 showed mild avidity in the left hilar nodule that presumably is the primary tumor, mild associated atelectasis with no mediastinal adenopathy and no distant metastatic lesion.  There was a low avidity left renal nodule raising concern for renal cell carcinoma or a low-grade neoplasm.  Contrast-enhanced CT and or MRI or ultrasound was recommended.  MRI of the brain on 11/8/2018 showed no intracranial metastasis.  Patient underwent surgery on 11/14/2018 which was a left lower lobe sleeve resection.  Lymph nodes were dissected as well.  Pathology revealed a low-grade carcinoma with neuroendocrine features, pathological stage was a pT1c, N0, M0 stage IA3 low-grade carcinoma with neuroendocrine features.  The patient then  underwent imaging on 9/23/2019, there was a partially visualized 4.1 cm mass arising from the right kidney concerning for renal cell carcinoma.  A CT of the abdomen and pelvis on 9/24/2019 showed a 4.2 cm 60% exophytic lesion arising from the lower pole of the right kidney compatible with renal cell carcinoma.  No definite metastatic disease within the abdomen.     Patient underwent a biopsy on 10/10/2019 which revealed an oncocytic renal neoplasm favoring oncocytoma.  The patient then had follow-up CT of the abdomen and pelvis on 6/26/2020 at [facility] in [place], [place], this showed a heterogeneously enhancing exophytic mass off the posterior aspect of the inferior pole of the right kidney.  This measured 4.6 cm.  The spleen was enlarged at 13.5 cm in length.     The patient then underwent blood work here at [facility] on 7/7/2020 that revealed an elevated absolute lymphocyte count and lymphocyte percentage.  The rest of the CBC was unremarkable.  Peripheral blood smear reviewed by pathology showed several reactive lymphocytes and few atypical lymphocytes with moderate smudge cells present.  He was then seen at [facility] on 8/6/2020 and it was recommended that he follow-up locally for lymphocytosis and splenomegaly.  Repeat CBC on 8/25/2020 showed persistent lymphocytosis, repeat peripheral smear evaluation at that time showed an absolute and relative lymphocytosis with reactive lymphocytes, few atypical appearing lymphocytes, and moderate smudge cells present.      He presented to see me initially on 9/14/2020.  CBC on that day revealed a white blood cell count of 8.7, hemoglobin of 16, hematocrit of 47.5, platelet count of 199,000, MCV of 89 with a lymphocyte percent of 49.1 and an absolute lymphocyte count of 4.3.  He stated at that visit that he felt great, he denied any weight loss, fever, chills, night sweats, headaches, chest pain, shortness of breath, swollen lymph nodes, abdominal pain,  nausea, vomiting, constipation, diarrhea, melena, bright red blood per rectum, seizures, or any other major issues.     Further evaluation on 9/14/2020 revealed a CLL FISH panel that was negative for 11q deletion, trisomy 12, 13q deletion, or 17p deletion.  Flow cytometry showed a B-cell lineage that was CD19 positive, CD20 positive restricted to lambda light chains with coexpression of HLA-DR, CD5, and CD22.  It was negative for  and CD11c.  The absolute lymphocyte count was 4.3×10E3/uL, most consistent with a diagnosis of monoclonal B-cell lymphocytosis.     CT scan of the abdomen on 2/2/2022 showed stable mass off the inferior pole of the right kidney compatible with biopsy-proven oncocytic neoplasm, favor oncocytoma with no evidence of metastatic disease in the abdomen.  These have been done on an every six-month basis, most recently on 09/08/2022 at [facility] imaging showing a new 4 mm right lower lobe lung nodule and otherwise stable scans.       Interval History:   Patient here for follow up. He continues to do well overall. He continues to follow up with [facility] and continues on surveillance of renal oncocytoma.        Past Medical History:   Diagnosis Date    Carcinoid tumor of lung     Diverticulosis     Gastroesophageal reflux disease     History of lobectomy of lung     History of malignant neoplasm of lower lobe bronchus or lung     Male erectile dysfunction, unspecified     Monoclonal B-cell lymphocytosis     Other seasonal allergic rhinitis     Pneumothorax, unspecified     Postoperative nausea and vomiting     Prostate cancer     Renal oncocytoma of right kidney     Tinnitus, unspecified ear     Unspecified sensorineural hearing loss       Past Surgical History:   Procedure Laterality Date    BRONCHOSCOPY  10/18/2018    COLONOSCOPY  05/27/2016    Flavio Riley MD    Left Lung Lombectomy  Left     PROSTATECTOMY      SALIVARY GLAND REMOVED      TRIGGER FINGER RELEASE Right  10/2021     Social History     Socioeconomic History    Marital status:    Occupational History    Occupation: Retired   Tobacco Use    Smoking status: Never    Smokeless tobacco: Never   Substance and Sexual Activity    Alcohol use: Yes     Comment: 1-2 per month    Drug use: Never    Sexual activity: Yes     Partners: Female      Family History   Problem Relation Age of Onset    Hodgkin's lymphoma Mother         Non-Hodgkins    Diabetes Mellitus Father         type 2    Heart failure Father     Hypertension Sister       Review of patient's allergies indicates:   Allergen Reactions    Docusate      Other reaction(s): muscle contractions    Docusate sodium      Other reaction(s): muscle contractions    Sulfa (sulfonamide antibiotics)      Other reaction(s): migraine      Review of Systems   Constitutional:  Negative for chills, diaphoresis, fatigue, fever and unexpected weight change.   HENT:  Negative for nasal congestion, mouth sores, sinus pressure/congestion and sore throat.    Eyes:  Negative for pain and visual disturbance.   Respiratory:  Negative for cough, chest tightness and shortness of breath.    Cardiovascular:  Negative for chest pain, palpitations and leg swelling.   Gastrointestinal:  Negative for abdominal distention, abdominal pain, blood in stool, constipation and diarrhea.   Genitourinary:  Negative for dysuria, frequency and hematuria.   Musculoskeletal:  Negative for arthralgias and back pain.   Integumentary:  Negative for rash.   Neurological:  Negative for dizziness, weakness, numbness and headaches.   Hematological:  Negative for adenopathy.   Psychiatric/Behavioral:  Negative for confusion.        Objective:      Physical Exam  Vitals reviewed.   Constitutional:       General: He is awake.      Appearance: Normal appearance.   HENT:      Head: Normocephalic and atraumatic.      Right Ear: Hearing normal.      Left Ear: Hearing normal.      Nose: Nose normal.   Eyes:       General: Lids are normal. Vision grossly intact.      Extraocular Movements: Extraocular movements intact.      Conjunctiva/sclera: Conjunctivae normal.   Cardiovascular:      Rate and Rhythm: Normal rate and regular rhythm.      Pulses: Normal pulses.      Heart sounds: Normal heart sounds.   Pulmonary:      Effort: Pulmonary effort is normal.      Breath sounds: Normal breath sounds. No wheezing, rhonchi or rales.   Abdominal:      General: Bowel sounds are normal.      Palpations: Abdomen is soft.      Tenderness: There is no abdominal tenderness.   Musculoskeletal:      Cervical back: Full passive range of motion without pain.      Right lower leg: No edema.      Left lower leg: No edema.   Lymphadenopathy:      Cervical: No cervical adenopathy.      Upper Body:      Right upper body: No supraclavicular or axillary adenopathy.      Left upper body: No supraclavicular or axillary adenopathy.   Skin:     General: Skin is warm.   Neurological:      General: No focal deficit present.      Mental Status: He is alert and oriented to person, place, and time.   Psychiatric:         Attention and Perception: Attention normal.         Mood and Affect: Mood and affect normal.         Behavior: Behavior is cooperative.       LABS AND IMAGING REVIEWED IN EPIC          Assessment:     1.  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  3.  Renal oncocytoma         Plan:         Patient has a diagnosis of monoclonal B-cell lymphocytosis.  CLL FISH panel was negative.     If/when the patient does progress to CLL, we will check an IgHV mutation.     Continue observation for B cell lymphocytosis, stable at this time     As for his low-grade carcinoma of his lung and renal oncocytoma, he will continue to follow at [facility]-- Next appointment is in March 2023    CT scan of the chest, abdomen, and pelvis prior to appointment.     RTC in 6 months to review scan results      Labs prior: CBC, CMP, LDH       Jet Slaughter II, MD      I, Inés Allen LPN, acted solely as a scribe for and in the presence of, Dr. Jet Slaughter who performed the service.

## 2022-09-14 ENCOUNTER — LAB VISIT (OUTPATIENT)
Dept: LAB | Facility: HOSPITAL | Age: 77
End: 2022-09-14
Payer: MEDICARE

## 2022-09-14 ENCOUNTER — OFFICE VISIT (OUTPATIENT)
Dept: HEMATOLOGY/ONCOLOGY | Facility: CLINIC | Age: 77
End: 2022-09-14
Payer: MEDICARE

## 2022-09-14 DIAGNOSIS — D72.820 MONOCLONAL B-CELL LYMPHOCYTOSIS: Primary | ICD-10-CM

## 2022-09-14 DIAGNOSIS — R91.1 LUNG NODULE: ICD-10-CM

## 2022-09-14 DIAGNOSIS — D30.01 RENAL ONCOCYTOMA OF RIGHT KIDNEY: ICD-10-CM

## 2022-09-14 DIAGNOSIS — D3A.090 CARCINOID TUMOR OF LUNG, UNSPECIFIED WHETHER MALIGNANT: ICD-10-CM

## 2022-09-14 DIAGNOSIS — D72.820 MONOCLONAL B-CELL LYMPHOCYTOSIS: ICD-10-CM

## 2022-09-14 LAB
ALBUMIN SERPL-MCNC: 3.9 GM/DL (ref 3.4–4.8)
ALBUMIN/GLOB SERPL: 1.8 RATIO (ref 1.1–2)
ALP SERPL-CCNC: 63 UNIT/L (ref 40–150)
ALT SERPL-CCNC: 15 UNIT/L (ref 0–55)
AST SERPL-CCNC: 17 UNIT/L (ref 5–34)
BASOPHILS # BLD AUTO: 0.04 X10(3)/MCL (ref 0–0.2)
BASOPHILS NFR BLD AUTO: 0.5 %
BILIRUBIN DIRECT+TOT PNL SERPL-MCNC: 0.7 MG/DL
BUN SERPL-MCNC: 12.8 MG/DL (ref 8.4–25.7)
CALCIUM SERPL-MCNC: 9.9 MG/DL (ref 8.8–10)
CHLORIDE SERPL-SCNC: 103 MMOL/L (ref 98–107)
CO2 SERPL-SCNC: 30 MMOL/L (ref 23–31)
CREAT SERPL-MCNC: 1.48 MG/DL (ref 0.73–1.18)
EOSINOPHIL # BLD AUTO: 0.21 X10(3)/MCL (ref 0–0.9)
EOSINOPHIL NFR BLD AUTO: 2.6 %
ERYTHROCYTE [DISTWIDTH] IN BLOOD BY AUTOMATED COUNT: 13.2 % (ref 11.5–17)
GFR SERPLBLD CREATININE-BSD FMLA CKD-EPI: 49 MLS/MIN/1.73/M2
GLOBULIN SER-MCNC: 2.2 GM/DL (ref 2.4–3.5)
GLUCOSE SERPL-MCNC: 92 MG/DL (ref 82–115)
HCT VFR BLD AUTO: 47.9 % (ref 42–52)
HGB BLD-MCNC: 15.4 GM/DL (ref 14–18)
IMM GRANULOCYTES # BLD AUTO: 0.05 X10(3)/MCL (ref 0–0.04)
IMM GRANULOCYTES NFR BLD AUTO: 0.6 %
LDH SERPL-CCNC: 177 U/L (ref 125–220)
LYMPHOCYTES # BLD AUTO: 4.1 X10(3)/MCL (ref 0.6–4.6)
LYMPHOCYTES NFR BLD AUTO: 50.2 %
MCH RBC QN AUTO: 29 PG (ref 27–31)
MCHC RBC AUTO-ENTMCNC: 32.2 MG/DL (ref 33–36)
MCV RBC AUTO: 90.2 FL (ref 80–94)
MONOCYTES # BLD AUTO: 0.62 X10(3)/MCL (ref 0.1–1.3)
MONOCYTES NFR BLD AUTO: 7.6 %
NEUTROPHILS # BLD AUTO: 3.2 X10(3)/MCL (ref 2.1–9.2)
NEUTROPHILS NFR BLD AUTO: 38.5 %
PLATELET # BLD AUTO: 170 X10(3)/MCL (ref 130–400)
PMV BLD AUTO: 9.8 FL (ref 7.4–10.4)
POTASSIUM SERPL-SCNC: 3.9 MMOL/L (ref 3.5–5.1)
PROT SERPL-MCNC: 6.1 GM/DL (ref 5.8–7.6)
RBC # BLD AUTO: 5.31 X10(6)/MCL (ref 4.7–6.1)
SODIUM SERPL-SCNC: 141 MMOL/L (ref 136–145)
WBC # SPEC AUTO: 8.2 X10(3)/MCL (ref 4.5–11.5)

## 2022-09-14 PROCEDURE — 99214 PR OFFICE/OUTPT VISIT, EST, LEVL IV, 30-39 MIN: ICD-10-PCS | Mod: S$PBB,,, | Performed by: INTERNAL MEDICINE

## 2022-09-14 PROCEDURE — 99213 OFFICE O/P EST LOW 20 MIN: CPT | Mod: PBBFAC | Performed by: INTERNAL MEDICINE

## 2022-09-14 PROCEDURE — 83615 LACTATE (LD) (LDH) ENZYME: CPT

## 2022-09-14 PROCEDURE — 85025 COMPLETE CBC W/AUTO DIFF WBC: CPT

## 2022-09-14 PROCEDURE — 80053 COMPREHEN METABOLIC PANEL: CPT

## 2022-09-14 PROCEDURE — 99999 PR PBB SHADOW E&M-EST. PATIENT-LVL III: CPT | Mod: PBBFAC,,, | Performed by: INTERNAL MEDICINE

## 2022-09-14 PROCEDURE — 99999 PR PBB SHADOW E&M-EST. PATIENT-LVL III: ICD-10-PCS | Mod: PBBFAC,,, | Performed by: INTERNAL MEDICINE

## 2022-09-14 PROCEDURE — 36415 COLL VENOUS BLD VENIPUNCTURE: CPT

## 2022-09-14 PROCEDURE — 99214 OFFICE O/P EST MOD 30 MIN: CPT | Mod: S$PBB,,, | Performed by: INTERNAL MEDICINE

## 2023-03-02 ENCOUNTER — TELEPHONE (OUTPATIENT)
Dept: HEMATOLOGY/ONCOLOGY | Facility: CLINIC | Age: 78
End: 2023-03-02
Payer: MEDICARE

## 2023-03-10 ENCOUNTER — LAB VISIT (OUTPATIENT)
Dept: LAB | Facility: HOSPITAL | Age: 78
End: 2023-03-10
Attending: INTERNAL MEDICINE
Payer: MEDICARE

## 2023-03-10 ENCOUNTER — TELEPHONE (OUTPATIENT)
Dept: HEMATOLOGY/ONCOLOGY | Facility: CLINIC | Age: 78
End: 2023-03-10
Payer: MEDICARE

## 2023-03-10 DIAGNOSIS — D30.01 RENAL ONCOCYTOMA OF RIGHT KIDNEY: ICD-10-CM

## 2023-03-10 DIAGNOSIS — D72.820 MONOCLONAL B-CELL LYMPHOCYTOSIS: ICD-10-CM

## 2023-03-10 DIAGNOSIS — D3A.090 CARCINOID TUMOR OF LUNG, UNSPECIFIED WHETHER MALIGNANT: ICD-10-CM

## 2023-03-10 DIAGNOSIS — R91.1 LUNG NODULE: ICD-10-CM

## 2023-03-10 LAB
ALBUMIN SERPL-MCNC: 4 G/DL (ref 3.4–4.8)
ALBUMIN/GLOB SERPL: 1.8 RATIO (ref 1.1–2)
ALP SERPL-CCNC: 60 UNIT/L (ref 40–150)
ALT SERPL-CCNC: 15 UNIT/L (ref 0–55)
AST SERPL-CCNC: 18 UNIT/L (ref 5–34)
BASOPHILS # BLD AUTO: 0.05 X10(3)/MCL (ref 0–0.2)
BASOPHILS NFR BLD AUTO: 0.7 %
BILIRUBIN DIRECT+TOT PNL SERPL-MCNC: 0.8 MG/DL
BUN SERPL-MCNC: 15.9 MG/DL (ref 8.4–25.7)
CALCIUM SERPL-MCNC: 9.3 MG/DL (ref 8.8–10)
CHLORIDE SERPL-SCNC: 108 MMOL/L (ref 98–107)
CO2 SERPL-SCNC: 31 MMOL/L (ref 23–31)
CREAT SERPL-MCNC: 1.27 MG/DL (ref 0.73–1.18)
EOSINOPHIL # BLD AUTO: 0.21 X10(3)/MCL (ref 0–0.9)
EOSINOPHIL NFR BLD AUTO: 2.7 %
ERYTHROCYTE [DISTWIDTH] IN BLOOD BY AUTOMATED COUNT: 12.6 % (ref 11.5–17)
GFR SERPLBLD CREATININE-BSD FMLA CKD-EPI: 58 MLS/MIN/1.73/M2
GLOBULIN SER-MCNC: 2.2 GM/DL (ref 2.4–3.5)
GLUCOSE SERPL-MCNC: 97 MG/DL (ref 82–115)
HCT VFR BLD AUTO: 45.8 % (ref 42–52)
HGB BLD-MCNC: 14.9 G/DL (ref 14–18)
IMM GRANULOCYTES # BLD AUTO: 0.02 X10(3)/MCL (ref 0–0.04)
IMM GRANULOCYTES NFR BLD AUTO: 0.3 %
LDH SERPL-CCNC: 153 U/L (ref 125–220)
LYMPHOCYTES # BLD AUTO: 4.32 X10(3)/MCL (ref 0.6–4.6)
LYMPHOCYTES NFR BLD AUTO: 56.4 %
MCH RBC QN AUTO: 29.4 PG
MCHC RBC AUTO-ENTMCNC: 32.5 G/DL (ref 33–36)
MCV RBC AUTO: 90.3 FL (ref 80–94)
MONOCYTES # BLD AUTO: 0.51 X10(3)/MCL (ref 0.1–1.3)
MONOCYTES NFR BLD AUTO: 6.7 %
NEUTROPHILS # BLD AUTO: 2.55 X10(3)/MCL (ref 2.1–9.2)
NEUTROPHILS NFR BLD AUTO: 33.2 %
PLATELET # BLD AUTO: 186 X10(3)/MCL (ref 130–400)
PMV BLD AUTO: 9.8 FL (ref 7.4–10.4)
POTASSIUM SERPL-SCNC: 5 MMOL/L (ref 3.5–5.1)
PROT SERPL-MCNC: 6.2 GM/DL (ref 5.8–7.6)
RBC # BLD AUTO: 5.07 X10(6)/MCL (ref 4.7–6.1)
SODIUM SERPL-SCNC: 144 MMOL/L (ref 136–145)
WBC # SPEC AUTO: 7.7 X10(3)/MCL (ref 4.5–11.5)

## 2023-03-10 PROCEDURE — 83615 LACTATE (LD) (LDH) ENZYME: CPT

## 2023-03-10 PROCEDURE — 80053 COMPREHEN METABOLIC PANEL: CPT

## 2023-03-10 PROCEDURE — 36415 COLL VENOUS BLD VENIPUNCTURE: CPT

## 2023-03-10 PROCEDURE — 85025 COMPLETE CBC W/AUTO DIFF WBC: CPT

## 2023-03-10 NOTE — TELEPHONE ENCOUNTER
Patient called and cancelled CT scan at [facility] as he is doing them at [facility]. Patient was asking if Dr. Slaughter was okay with that and he was. Will obtain results. Patient understood.

## 2023-03-14 NOTE — PROGRESS NOTES
Subjective:       Patient ID: Robert Paniagua is a 78 y.o. male.    Chief Complaint: No chief complaint on file.        Diagnosis:  1.  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  3.  Renal oncocytoma    Current Treatment:   Observation for monoclonal B-cell lymphocytosis.    Treatment History:  N/A    HPI:  Patient presented in December 2017 with hemoptysis at [facility].  A chest x-ray at that time was negative.  He then developed hemoptysis again in August 2018 and a chest x-ray revealed increasing interstitial markings in the left lower hilar region with possible abnormal enlargement in the left hilum.  A CT of the chest on 10/9/2018 without contrast showed an irregular endobronchial lesion in the left main bronchus to the left lower lobe takeoff.  It measured 1.9 cm.  There was atelectasis involving the anterior medial segment of the left lower lobe.  There was no focal pulmonary nodule.  There were few scattered nonenlarged mediastinal lymph nodes.  Bronchoscopy on 10/24/2018 with a biopsy of the lesion in the left mainstem revealed adenocarcinoma.  Brushings and washings were negative for malignancy.       PET/CT scan on 11/7/2018 showed mild avidity in the left hilar nodule that presumably is the primary tumor, mild associated atelectasis with no mediastinal adenopathy and no distant metastatic lesion.  There was a low avidity left renal nodule raising concern for renal cell carcinoma or a low-grade neoplasm.  Contrast-enhanced CT and or MRI or ultrasound was recommended.  MRI of the brain on 11/8/2018 showed no intracranial metastasis.  Patient underwent surgery on 11/14/2018 which was a left lower lobe sleeve resection.  Lymph nodes were dissected as well.  Pathology revealed a low-grade carcinoma with neuroendocrine features, pathological stage was a pT1c, N0, M0 stage IA3 low-grade carcinoma with neuroendocrine features.  The patient then underwent imaging  on 9/23/2019, there was a partially visualized 4.1 cm mass arising from the right kidney concerning for renal cell carcinoma.  A CT of the abdomen and pelvis on 9/24/2019 showed a 4.2 cm 60% exophytic lesion arising from the lower pole of the right kidney compatible with renal cell carcinoma.  No definite metastatic disease within the abdomen.     Patient underwent a biopsy on 10/10/2019 which revealed an oncocytic renal neoplasm favoring oncocytoma.  The patient then had follow-up CT of the abdomen and pelvis on 6/26/2020 at [facility] in [place], [place], this showed a heterogeneously enhancing exophytic mass off the posterior aspect of the inferior pole of the right kidney.  This measured 4.6 cm.  The spleen was enlarged at 13.5 cm in length.     The patient then underwent blood work here at [facility] on 7/7/2020 that revealed an elevated absolute lymphocyte count and lymphocyte percentage.  The rest of the CBC was unremarkable.  Peripheral blood smear reviewed by pathology showed several reactive lymphocytes and few atypical lymphocytes with moderate smudge cells present.  He was then seen at [facility] on 8/6/2020 and it was recommended that he follow-up locally for lymphocytosis and splenomegaly.  Repeat CBC on 8/25/2020 showed persistent lymphocytosis, repeat peripheral smear evaluation at that time showed an absolute and relative lymphocytosis with reactive lymphocytes, few atypical appearing lymphocytes, and moderate smudge cells present.      He presented to see me initially on 9/14/2020.  CBC on that day revealed a white blood cell count of 8.7, hemoglobin of 16, hematocrit of 47.5, platelet count of 199,000, MCV of 89 with a lymphocyte percent of 49.1 and an absolute lymphocyte count of 4.3.  He stated at that visit that he felt great, he denied any weight loss, fever, chills, night sweats, headaches, chest pain, shortness of breath, swollen lymph nodes, abdominal pain, nausea, vomiting,  constipation, diarrhea, melena, bright red blood per rectum, seizures, or any other major issues.     Further evaluation on 9/14/2020 revealed a CLL FISH panel that was negative for 11q deletion, trisomy 12, 13q deletion, or 17p deletion.  Flow cytometry showed a B-cell lineage that was CD19 positive, CD20 positive restricted to lambda light chains with coexpression of HLA-DR, CD5, and CD22.  It was negative for  and CD11c.  The absolute lymphocyte count was 4.3×10E3/uL, most consistent with a diagnosis of monoclonal B-cell lymphocytosis.     CT scan of the abdomen on 2/2/2022 showed stable mass off the inferior pole of the right kidney compatible with biopsy-proven oncocytic neoplasm, favor oncocytoma with no evidence of metastatic disease in the abdomen.  These have been done on an every six-month basis, most recently on 02/01/2023 at [facility] showing stable disease.       Interval History:   Patient here for follow up. He continues to do well overall. He continues to follow up with [facility] and continues on surveillance of renal oncocytoma.  Overall he is doing well with no major issues.      Past Medical History:   Diagnosis Date    Carcinoid tumor of lung     Diverticulosis     Gastroesophageal reflux disease     History of lobectomy of lung     History of malignant neoplasm of lower lobe bronchus or lung     Male erectile dysfunction, unspecified     Monoclonal B-cell lymphocytosis     Other seasonal allergic rhinitis     Pneumothorax, unspecified     Postoperative nausea and vomiting     Prostate cancer     Renal oncocytoma of right kidney     Tinnitus, unspecified ear     Unspecified sensorineural hearing loss       Past Surgical History:   Procedure Laterality Date    BRONCHOSCOPY  10/18/2018    COLONOSCOPY  05/27/2016    Flavio Riley MD    Left Lung Lombectomy  Left     PROSTATECTOMY      SALIVARY GLAND REMOVED      TRIGGER FINGER RELEASE Right 10/2021     Social History     Socioeconomic History     Marital status:    Occupational History    Occupation: Retired   Tobacco Use    Smoking status: Never    Smokeless tobacco: Never   Substance and Sexual Activity    Alcohol use: Yes     Comment: 1-2 per month    Drug use: Never    Sexual activity: Yes     Partners: Female      Family History   Problem Relation Age of Onset    Hodgkin's lymphoma Mother         Non-Hodgkins    Diabetes Mellitus Father         type 2    Heart failure Father     Hypertension Sister       Review of patient's allergies indicates:   Allergen Reactions    Docusate      Other reaction(s): muscle contractions    Docusate sodium      Other reaction(s): muscle contractions    Sulfa (sulfonamide antibiotics)      Other reaction(s): migraine      Review of Systems   Constitutional:  Negative for chills, diaphoresis, fatigue, fever and unexpected weight change.   HENT:  Negative for nasal congestion, mouth sores, sinus pressure/congestion and sore throat.    Eyes:  Negative for pain and visual disturbance.   Respiratory:  Negative for cough, chest tightness and shortness of breath.    Cardiovascular:  Negative for chest pain, palpitations and leg swelling.   Gastrointestinal:  Negative for abdominal distention, abdominal pain, blood in stool, constipation and diarrhea.   Genitourinary:  Negative for dysuria, frequency and hematuria.   Musculoskeletal:  Negative for arthralgias and back pain.   Integumentary:  Negative for rash.   Neurological:  Negative for dizziness, weakness, numbness and headaches.   Hematological:  Negative for adenopathy.   Psychiatric/Behavioral:  Negative for confusion.        Objective:      Physical Exam  Vitals reviewed.   Constitutional:       General: He is awake.      Appearance: Normal appearance.   HENT:      Head: Normocephalic and atraumatic.      Right Ear: Hearing normal.      Left Ear: Hearing normal.      Nose: Nose normal.   Eyes:      General: Lids are normal. Vision grossly intact.       Extraocular Movements: Extraocular movements intact.      Conjunctiva/sclera: Conjunctivae normal.   Cardiovascular:      Rate and Rhythm: Normal rate and regular rhythm.      Pulses: Normal pulses.      Heart sounds: Normal heart sounds.   Pulmonary:      Effort: Pulmonary effort is normal.      Breath sounds: Normal breath sounds. No wheezing, rhonchi or rales.   Abdominal:      General: Bowel sounds are normal.      Palpations: Abdomen is soft.      Tenderness: There is no abdominal tenderness.   Musculoskeletal:      Cervical back: Full passive range of motion without pain.      Right lower leg: No edema.      Left lower leg: No edema.   Lymphadenopathy:      Cervical: No cervical adenopathy.      Upper Body:      Right upper body: No supraclavicular or axillary adenopathy.      Left upper body: No supraclavicular or axillary adenopathy.   Skin:     General: Skin is warm.   Neurological:      General: No focal deficit present.      Mental Status: He is alert and oriented to person, place, and time.   Psychiatric:         Attention and Perception: Attention normal.         Mood and Affect: Mood and affect normal.         Behavior: Behavior is cooperative.       LABS AND IMAGING REVIEWED IN EPIC          Assessment:     1.  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  3.  Renal oncocytoma         Plan:         Patient has a diagnosis of monoclonal B-cell lymphocytosis.  CLL FISH panel was negative.     If/when the patient does progress to CLL, we will check an IgHV mutation.     Continue observation for B cell lymphocytosis, stable at this time     As for his low-grade carcinoma of his lung and renal oncocytoma, he will continue to follow at [facility]-- Next appointment is in February 2024.    RTC in 6 months to review labs.     Labs prior: CBC, CMP    Jet Slaughter II, MD

## 2023-03-15 ENCOUNTER — OFFICE VISIT (OUTPATIENT)
Dept: HEMATOLOGY/ONCOLOGY | Facility: CLINIC | Age: 78
End: 2023-03-15
Payer: MEDICARE

## 2023-03-15 VITALS
DIASTOLIC BLOOD PRESSURE: 84 MMHG | BODY MASS INDEX: 25.62 KG/M2 | WEIGHT: 183 LBS | TEMPERATURE: 98 F | HEIGHT: 71 IN | RESPIRATION RATE: 18 BRPM | SYSTOLIC BLOOD PRESSURE: 128 MMHG | HEART RATE: 70 BPM | OXYGEN SATURATION: 98 %

## 2023-03-15 DIAGNOSIS — D30.01 RENAL ONCOCYTOMA OF RIGHT KIDNEY: ICD-10-CM

## 2023-03-15 DIAGNOSIS — D72.820 MONOCLONAL B-CELL LYMPHOCYTOSIS: Primary | ICD-10-CM

## 2023-03-15 DIAGNOSIS — C61 PROSTATE CANCER: ICD-10-CM

## 2023-03-15 DIAGNOSIS — D3A.090 CARCINOID TUMOR OF LUNG, UNSPECIFIED WHETHER MALIGNANT: ICD-10-CM

## 2023-03-15 PROCEDURE — 99213 OFFICE O/P EST LOW 20 MIN: CPT | Mod: S$PBB,,, | Performed by: INTERNAL MEDICINE

## 2023-03-15 PROCEDURE — 99213 PR OFFICE/OUTPT VISIT, EST, LEVL III, 20-29 MIN: ICD-10-PCS | Mod: S$PBB,,, | Performed by: INTERNAL MEDICINE

## 2023-03-15 PROCEDURE — 99214 OFFICE O/P EST MOD 30 MIN: CPT | Mod: PBBFAC | Performed by: INTERNAL MEDICINE

## 2023-03-15 PROCEDURE — 99999 PR PBB SHADOW E&M-EST. PATIENT-LVL IV: ICD-10-PCS | Mod: PBBFAC,,, | Performed by: INTERNAL MEDICINE

## 2023-03-15 PROCEDURE — 99999 PR PBB SHADOW E&M-EST. PATIENT-LVL IV: CPT | Mod: PBBFAC,,, | Performed by: INTERNAL MEDICINE

## 2023-08-24 PROBLEM — E78.1 HYPERTRIGLYCERIDEMIA: Status: ACTIVE | Noted: 2023-08-24

## 2023-08-24 PROCEDURE — 82570 ASSAY OF URINE CREATININE: CPT | Performed by: FAMILY MEDICINE

## 2023-09-15 ENCOUNTER — LAB VISIT (OUTPATIENT)
Dept: LAB | Facility: HOSPITAL | Age: 78
End: 2023-09-15
Attending: INTERNAL MEDICINE
Payer: MEDICARE

## 2023-09-15 DIAGNOSIS — D3A.090 CARCINOID TUMOR OF LUNG, UNSPECIFIED WHETHER MALIGNANT: ICD-10-CM

## 2023-09-15 DIAGNOSIS — D72.820 MONOCLONAL B-CELL LYMPHOCYTOSIS: ICD-10-CM

## 2023-09-15 DIAGNOSIS — C61 PROSTATE CANCER: ICD-10-CM

## 2023-09-15 DIAGNOSIS — D30.01 RENAL ONCOCYTOMA OF RIGHT KIDNEY: ICD-10-CM

## 2023-09-15 LAB
ALBUMIN SERPL-MCNC: 4.1 G/DL (ref 3.4–4.8)
ALBUMIN/GLOB SERPL: 2 RATIO (ref 1.1–2)
ALP SERPL-CCNC: 58 UNIT/L (ref 40–150)
ALT SERPL-CCNC: 16 UNIT/L (ref 0–55)
AST SERPL-CCNC: 20 UNIT/L (ref 5–34)
BASOPHILS # BLD AUTO: 0.04 X10(3)/MCL
BASOPHILS NFR BLD AUTO: 0.6 %
BILIRUB SERPL-MCNC: 0.9 MG/DL
BUN SERPL-MCNC: 13.5 MG/DL (ref 8.4–25.7)
CALCIUM SERPL-MCNC: 9.2 MG/DL (ref 8.8–10)
CHLORIDE SERPL-SCNC: 105 MMOL/L (ref 98–107)
CO2 SERPL-SCNC: 28 MMOL/L (ref 23–31)
CREAT SERPL-MCNC: 1.44 MG/DL (ref 0.73–1.18)
EOSINOPHIL # BLD AUTO: 0.15 X10(3)/MCL (ref 0–0.9)
EOSINOPHIL NFR BLD AUTO: 2.3 %
ERYTHROCYTE [DISTWIDTH] IN BLOOD BY AUTOMATED COUNT: 13.2 % (ref 11.5–17)
GFR SERPLBLD CREATININE-BSD FMLA CKD-EPI: 50 MLS/MIN/1.73/M2
GLOBULIN SER-MCNC: 2.1 GM/DL (ref 2.4–3.5)
GLUCOSE SERPL-MCNC: 82 MG/DL (ref 82–115)
HCT VFR BLD AUTO: 47.5 % (ref 42–52)
HGB BLD-MCNC: 15.4 G/DL (ref 14–18)
IMM GRANULOCYTES # BLD AUTO: 0.02 X10(3)/MCL (ref 0–0.04)
IMM GRANULOCYTES NFR BLD AUTO: 0.3 %
LYMPHOCYTES # BLD AUTO: 2.94 X10(3)/MCL (ref 0.6–4.6)
LYMPHOCYTES NFR BLD AUTO: 44.7 %
MCH RBC QN AUTO: 29 PG (ref 27–31)
MCHC RBC AUTO-ENTMCNC: 32.4 G/DL (ref 33–36)
MCV RBC AUTO: 89.5 FL (ref 80–94)
MONOCYTES # BLD AUTO: 0.6 X10(3)/MCL (ref 0.1–1.3)
MONOCYTES NFR BLD AUTO: 9.1 %
NEUTROPHILS # BLD AUTO: 2.82 X10(3)/MCL (ref 2.1–9.2)
NEUTROPHILS NFR BLD AUTO: 43 %
PLATELET # BLD AUTO: 185 X10(3)/MCL (ref 130–400)
PMV BLD AUTO: 10.7 FL (ref 7.4–10.4)
POTASSIUM SERPL-SCNC: 4.1 MMOL/L (ref 3.5–5.1)
PROT SERPL-MCNC: 6.2 GM/DL (ref 5.8–7.6)
RBC # BLD AUTO: 5.31 X10(6)/MCL (ref 4.7–6.1)
SODIUM SERPL-SCNC: 144 MMOL/L (ref 136–145)
WBC # SPEC AUTO: 6.57 X10(3)/MCL (ref 4.5–11.5)

## 2023-09-15 PROCEDURE — 85025 COMPLETE CBC W/AUTO DIFF WBC: CPT

## 2023-09-15 PROCEDURE — 80053 COMPREHEN METABOLIC PANEL: CPT

## 2023-09-15 PROCEDURE — 36415 COLL VENOUS BLD VENIPUNCTURE: CPT

## 2023-09-20 ENCOUNTER — OFFICE VISIT (OUTPATIENT)
Dept: HEMATOLOGY/ONCOLOGY | Facility: CLINIC | Age: 78
End: 2023-09-20
Payer: MEDICARE

## 2023-09-20 VITALS
OXYGEN SATURATION: 97 % | SYSTOLIC BLOOD PRESSURE: 147 MMHG | RESPIRATION RATE: 19 BRPM | WEIGHT: 177.5 LBS | DIASTOLIC BLOOD PRESSURE: 87 MMHG | BODY MASS INDEX: 24.85 KG/M2 | HEIGHT: 71 IN | TEMPERATURE: 99 F | HEART RATE: 63 BPM

## 2023-09-20 DIAGNOSIS — D72.820 MONOCLONAL B-CELL LYMPHOCYTOSIS: Primary | ICD-10-CM

## 2023-09-20 DIAGNOSIS — D30.01 RENAL ONCOCYTOMA OF RIGHT KIDNEY: ICD-10-CM

## 2023-09-20 DIAGNOSIS — C7A.090 MALIGNANT CARCINOID TUMOR OF LUNG: ICD-10-CM

## 2023-09-20 PROCEDURE — 99999 PR PBB SHADOW E&M-EST. PATIENT-LVL III: CPT | Mod: PBBFAC,,, | Performed by: NURSE PRACTITIONER

## 2023-09-20 PROCEDURE — 99213 PR OFFICE/OUTPT VISIT, EST, LEVL III, 20-29 MIN: ICD-10-PCS | Mod: S$PBB,,, | Performed by: NURSE PRACTITIONER

## 2023-09-20 PROCEDURE — 99213 OFFICE O/P EST LOW 20 MIN: CPT | Mod: S$PBB,,, | Performed by: NURSE PRACTITIONER

## 2023-09-20 PROCEDURE — 99999 PR PBB SHADOW E&M-EST. PATIENT-LVL III: ICD-10-PCS | Mod: PBBFAC,,, | Performed by: NURSE PRACTITIONER

## 2023-09-20 PROCEDURE — 99213 OFFICE O/P EST LOW 20 MIN: CPT | Mod: PBBFAC | Performed by: NURSE PRACTITIONER

## 2023-09-20 NOTE — PROGRESS NOTES
Subjective:       Patient ID: Robert Paniagua is a 78 y.o. male.    Chief Complaint: Follow-up (No concerns today)        Diagnosis:  1.  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  3.  Renal oncocytoma    Current Treatment:   Observation for monoclonal B-cell lymphocytosis.    Treatment History:  N/A    HPI:  Patient presented in December 2017 with hemoptysis at [facility].  A chest x-ray at that time was negative.  He then developed hemoptysis again in August 2018 and a chest x-ray revealed increasing interstitial markings in the left lower hilar region with possible abnormal enlargement in the left hilum.  A CT of the chest on 10/9/2018 without contrast showed an irregular endobronchial lesion in the left main bronchus to the left lower lobe takeoff.  It measured 1.9 cm.  There was atelectasis involving the anterior medial segment of the left lower lobe.  There was no focal pulmonary nodule.  There were few scattered nonenlarged mediastinal lymph nodes.  Bronchoscopy on 10/24/2018 with a biopsy of the lesion in the left mainstem revealed adenocarcinoma.  Brushings and washings were negative for malignancy.       PET/CT scan on 11/7/2018 showed mild avidity in the left hilar nodule that presumably is the primary tumor, mild associated atelectasis with no mediastinal adenopathy and no distant metastatic lesion.  There was a low avidity left renal nodule raising concern for renal cell carcinoma or a low-grade neoplasm.  Contrast-enhanced CT and or MRI or ultrasound was recommended.  MRI of the brain on 11/8/2018 showed no intracranial metastasis.  Patient underwent surgery on 11/14/2018 which was a left lower lobe sleeve resection.  Lymph nodes were dissected as well.  Pathology revealed a low-grade carcinoma with neuroendocrine features, pathological stage was a pT1c, N0, M0 stage IA3 low-grade carcinoma with neuroendocrine features.  The patient then underwent imaging  on 9/23/2019, there was a partially visualized 4.1 cm mass arising from the right kidney concerning for renal cell carcinoma.  A CT of the abdomen and pelvis on 9/24/2019 showed a 4.2 cm 60% exophytic lesion arising from the lower pole of the right kidney compatible with renal cell carcinoma.  No definite metastatic disease within the abdomen.     Patient underwent a biopsy on 10/10/2019 which revealed an oncocytic renal neoplasm favoring oncocytoma.  The patient then had follow-up CT of the abdomen and pelvis on 6/26/2020 at [facility] in [place], [place], this showed a heterogeneously enhancing exophytic mass off the posterior aspect of the inferior pole of the right kidney.  This measured 4.6 cm.  The spleen was enlarged at 13.5 cm in length.     The patient then underwent blood work here at [facility] on 7/7/2020 that revealed an elevated absolute lymphocyte count and lymphocyte percentage.  The rest of the CBC was unremarkable.  Peripheral blood smear reviewed by pathology showed several reactive lymphocytes and few atypical lymphocytes with moderate smudge cells present.  He was then seen at [facility] on 8/6/2020 and it was recommended that he follow-up locally for lymphocytosis and splenomegaly.  Repeat CBC on 8/25/2020 showed persistent lymphocytosis, repeat peripheral smear evaluation at that time showed an absolute and relative lymphocytosis with reactive lymphocytes, few atypical appearing lymphocytes, and moderate smudge cells present.      He presented to see me initially on 9/14/2020.  CBC on that day revealed a white blood cell count of 8.7, hemoglobin of 16, hematocrit of 47.5, platelet count of 199,000, MCV of 89 with a lymphocyte percent of 49.1 and an absolute lymphocyte count of 4.3.  He stated at that visit that he felt great, he denied any weight loss, fever, chills, night sweats, headaches, chest pain, shortness of breath, swollen lymph nodes, abdominal pain, nausea, vomiting,  constipation, diarrhea, melena, bright red blood per rectum, seizures, or any other major issues.     Further evaluation on 9/14/2020 revealed a CLL FISH panel that was negative for 11q deletion, trisomy 12, 13q deletion, or 17p deletion.  Flow cytometry showed a B-cell lineage that was CD19 positive, CD20 positive restricted to lambda light chains with coexpression of HLA-DR, CD5, and CD22.  It was negative for  and CD11c.  The absolute lymphocyte count was 4.3×10E3/uL, most consistent with a diagnosis of monoclonal B-cell lymphocytosis.     CT scan of the abdomen on 2/2/2022 showed stable mass off the inferior pole of the right kidney compatible with biopsy-proven oncocytic neoplasm, favor oncocytoma with no evidence of metastatic disease in the abdomen.  These have been done on an every six-month basis, most recently on 02/01/2023 at [facility] showing stable disease.       Interval History:   Patient here for follow up. He continues to do well overall. He continues to follow up with [facility] and continues on surveillance of renal oncocytoma.  He denies any fevers, fatigue, NS, unintentional weight loss.      Past Medical History:   Diagnosis Date    Carcinoid tumor of lung     Diverticulosis     Gastroesophageal reflux disease     History of lobectomy of lung     History of malignant neoplasm of lower lobe bronchus or lung     Male erectile dysfunction, unspecified     Monoclonal B-cell lymphocytosis     Other seasonal allergic rhinitis     Pneumothorax, unspecified     Postoperative nausea and vomiting     Prostate cancer     Renal oncocytoma of right kidney     Tinnitus, unspecified ear     Unspecified sensorineural hearing loss       Past Surgical History:   Procedure Laterality Date    BRONCHOSCOPY  10/18/2018    COLONOSCOPY  05/27/2016    Flavio Riley MD    Left Lung Lombectomy  Left     PROSTATECTOMY      SALIVARY GLAND REMOVED      TRIGGER FINGER RELEASE Right 10/2021     Social History      Socioeconomic History    Marital status:      Spouse name: Padmaja    Number of children: 2   Occupational History    Occupation: Retired   Tobacco Use    Smoking status: Never    Smokeless tobacco: Never   Substance and Sexual Activity    Alcohol use: Yes     Comment: 1-2 per month    Drug use: Never    Sexual activity: Not Currently     Partners: Female      Family History   Problem Relation Age of Onset    Hodgkin's lymphoma Mother         Non-Hodgkins    Diabetes Mellitus Father         type 2    Heart failure Father     Hypertension Sister       Review of patient's allergies indicates:   Allergen Reactions    Docusate      Other reaction(s): muscle contractions    Docusate sodium      Other reaction(s): muscle contractions    Sulfa (sulfonamide antibiotics)      Other reaction(s): migraine      Review of Systems   Constitutional:  Negative for chills, diaphoresis, fatigue, fever and unexpected weight change.   HENT:  Negative for nasal congestion, mouth sores, sinus pressure/congestion and sore throat.    Eyes:  Negative for pain and visual disturbance.   Respiratory:  Negative for cough, chest tightness and shortness of breath.    Cardiovascular:  Negative for chest pain, palpitations and leg swelling.   Gastrointestinal:  Negative for abdominal distention, abdominal pain, blood in stool, constipation and diarrhea.   Genitourinary:  Negative for dysuria, frequency and hematuria.   Musculoskeletal:  Negative for arthralgias and back pain.   Integumentary:  Negative for rash.   Neurological:  Negative for dizziness, weakness, numbness and headaches.   Hematological:  Negative for adenopathy.   Psychiatric/Behavioral:  Negative for confusion.          Objective:      Physical Exam  Vitals reviewed.   Constitutional:       General: He is awake.      Appearance: Normal appearance.   HENT:      Head: Normocephalic and atraumatic.      Right Ear: Hearing normal.      Left Ear: Hearing normal.      Nose: Nose  normal.   Eyes:      General: Lids are normal. Vision grossly intact.      Extraocular Movements: Extraocular movements intact.      Conjunctiva/sclera: Conjunctivae normal.   Cardiovascular:      Rate and Rhythm: Normal rate and regular rhythm.      Pulses: Normal pulses.      Heart sounds: Normal heart sounds.   Pulmonary:      Effort: Pulmonary effort is normal.      Breath sounds: Normal breath sounds. No wheezing, rhonchi or rales.   Abdominal:      General: Bowel sounds are normal.      Palpations: Abdomen is soft.      Tenderness: There is no abdominal tenderness.   Musculoskeletal:      Cervical back: Full passive range of motion without pain.      Right lower leg: No edema.      Left lower leg: No edema.   Lymphadenopathy:      Cervical: No cervical adenopathy.      Upper Body:      Right upper body: No supraclavicular or axillary adenopathy.      Left upper body: No supraclavicular or axillary adenopathy.   Skin:     General: Skin is warm.   Neurological:      General: No focal deficit present.      Mental Status: He is alert and oriented to person, place, and time.   Psychiatric:         Attention and Perception: Attention normal.         Mood and Affect: Mood and affect normal.         Behavior: Behavior is cooperative.         LABS AND IMAGING REVIEWED IN EPIC          Assessment:     1.  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  3.  Renal oncocytoma         Plan:         Patient has a diagnosis of monoclonal B-cell lymphocytosis.  CLL FISH panel was negative.     If/when the patient does progress to CLL, we will check an IgHV mutation.     Continue observation for B cell lymphocytosis, stable at this time     As for his low-grade carcinoma of his lung and renal oncocytoma, he will continue to follow at [facility]-- Next appointment is in February 2024.    RTC in 6 months to review labs.     Labs prior: CBC, CMP    DILMA Fuentes

## 2024-03-15 ENCOUNTER — LAB VISIT (OUTPATIENT)
Dept: LAB | Facility: HOSPITAL | Age: 79
End: 2024-03-15
Attending: NURSE PRACTITIONER
Payer: MEDICARE

## 2024-03-15 DIAGNOSIS — C7A.090 MALIGNANT CARCINOID TUMOR OF LUNG: ICD-10-CM

## 2024-03-15 DIAGNOSIS — D30.01 RENAL ONCOCYTOMA OF RIGHT KIDNEY: ICD-10-CM

## 2024-03-15 DIAGNOSIS — D72.820 MONOCLONAL B-CELL LYMPHOCYTOSIS: ICD-10-CM

## 2024-03-15 LAB
ALBUMIN SERPL-MCNC: 3.9 G/DL (ref 3.4–4.8)
ALBUMIN/GLOB SERPL: 1.9 RATIO (ref 1.1–2)
ALP SERPL-CCNC: 62 UNIT/L (ref 40–150)
ALT SERPL-CCNC: 18 UNIT/L (ref 0–55)
AST SERPL-CCNC: 22 UNIT/L (ref 5–34)
BASOPHILS # BLD AUTO: 0.05 X10(3)/MCL
BASOPHILS NFR BLD AUTO: 0.7 %
BILIRUB SERPL-MCNC: 0.7 MG/DL
BUN SERPL-MCNC: 14.5 MG/DL (ref 8.4–25.7)
CALCIUM SERPL-MCNC: 8.9 MG/DL (ref 8.8–10)
CHLORIDE SERPL-SCNC: 108 MMOL/L (ref 98–107)
CO2 SERPL-SCNC: 23 MMOL/L (ref 23–31)
CREAT SERPL-MCNC: 1.43 MG/DL (ref 0.73–1.18)
EOSINOPHIL # BLD AUTO: 0.2 X10(3)/MCL (ref 0–0.9)
EOSINOPHIL NFR BLD AUTO: 2.9 %
ERYTHROCYTE [DISTWIDTH] IN BLOOD BY AUTOMATED COUNT: 13 % (ref 11.5–17)
GFR SERPLBLD CREATININE-BSD FMLA CKD-EPI: 50 MLS/MIN/1.73/M2
GLOBULIN SER-MCNC: 2.1 GM/DL (ref 2.4–3.5)
GLUCOSE SERPL-MCNC: 85 MG/DL (ref 82–115)
HCT VFR BLD AUTO: 45.5 % (ref 42–52)
HGB BLD-MCNC: 15.6 G/DL (ref 14–18)
IMM GRANULOCYTES # BLD AUTO: 0.01 X10(3)/MCL (ref 0–0.04)
IMM GRANULOCYTES NFR BLD AUTO: 0.1 %
LYMPHOCYTES # BLD AUTO: 3.36 X10(3)/MCL (ref 0.6–4.6)
LYMPHOCYTES NFR BLD AUTO: 48.2 %
MCH RBC QN AUTO: 29.8 PG (ref 27–31)
MCHC RBC AUTO-ENTMCNC: 34.3 G/DL (ref 33–36)
MCV RBC AUTO: 86.8 FL (ref 80–94)
MONOCYTES # BLD AUTO: 0.57 X10(3)/MCL (ref 0.1–1.3)
MONOCYTES NFR BLD AUTO: 8.2 %
NEUTROPHILS # BLD AUTO: 2.78 X10(3)/MCL (ref 2.1–9.2)
NEUTROPHILS NFR BLD AUTO: 39.9 %
PLATELET # BLD AUTO: 187 X10(3)/MCL (ref 130–400)
PMV BLD AUTO: 10.3 FL (ref 7.4–10.4)
POTASSIUM SERPL-SCNC: 4.1 MMOL/L (ref 3.5–5.1)
PROT SERPL-MCNC: 6 GM/DL (ref 5.8–7.6)
RBC # BLD AUTO: 5.24 X10(6)/MCL (ref 4.7–6.1)
SODIUM SERPL-SCNC: 142 MMOL/L (ref 136–145)
WBC # SPEC AUTO: 6.97 X10(3)/MCL (ref 4.5–11.5)

## 2024-03-15 PROCEDURE — 36415 COLL VENOUS BLD VENIPUNCTURE: CPT

## 2024-03-15 PROCEDURE — 85025 COMPLETE CBC W/AUTO DIFF WBC: CPT

## 2024-03-15 PROCEDURE — 80053 COMPREHEN METABOLIC PANEL: CPT

## 2024-03-20 ENCOUNTER — OFFICE VISIT (OUTPATIENT)
Dept: HEMATOLOGY/ONCOLOGY | Facility: CLINIC | Age: 79
End: 2024-03-20
Payer: MEDICARE

## 2024-03-20 VITALS
SYSTOLIC BLOOD PRESSURE: 136 MMHG | BODY MASS INDEX: 25.37 KG/M2 | RESPIRATION RATE: 18 BRPM | WEIGHT: 181.19 LBS | HEART RATE: 62 BPM | HEIGHT: 71 IN | DIASTOLIC BLOOD PRESSURE: 84 MMHG | OXYGEN SATURATION: 97 %

## 2024-03-20 DIAGNOSIS — C7A.090 MALIGNANT CARCINOID TUMOR OF LUNG: Primary | ICD-10-CM

## 2024-03-20 DIAGNOSIS — D72.820 MONOCLONAL B-CELL LYMPHOCYTOSIS: ICD-10-CM

## 2024-03-20 PROCEDURE — 99999 PR PBB SHADOW E&M-EST. PATIENT-LVL III: CPT | Mod: PBBFAC,,, | Performed by: INTERNAL MEDICINE

## 2024-03-20 PROCEDURE — 99213 OFFICE O/P EST LOW 20 MIN: CPT | Mod: PBBFAC | Performed by: INTERNAL MEDICINE

## 2024-03-20 PROCEDURE — 99213 OFFICE O/P EST LOW 20 MIN: CPT | Mod: S$PBB,,, | Performed by: INTERNAL MEDICINE

## 2024-03-20 RX ORDER — METOPROLOL TARTRATE 25 MG/1
TABLET, FILM COATED ORAL
COMMUNITY

## 2024-03-20 NOTE — PROGRESS NOTES
Subjective:       Patient ID: Robert Paniagua is a 79 y.o. male.    Chief Complaint: Follow-up (Patient has no concerns today)        Diagnosis:  1.  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  3.  Renal oncocytoma    Current Treatment:   Observation for monoclonal B-cell lymphocytosis.    Treatment History:  N/A    HPI:  Patient presented in December 2017 with hemoptysis at [facility].  A chest x-ray at that time was negative.  He then developed hemoptysis again in August 2018 and a chest x-ray revealed increasing interstitial markings in the left lower hilar region with possible abnormal enlargement in the left hilum.  A CT of the chest on 10/9/2018 without contrast showed an irregular endobronchial lesion in the left main bronchus to the left lower lobe takeoff.  It measured 1.9 cm.  There was atelectasis involving the anterior medial segment of the left lower lobe.  There was no focal pulmonary nodule.  There were few scattered nonenlarged mediastinal lymph nodes.  Bronchoscopy on 10/24/2018 with a biopsy of the lesion in the left mainstem revealed adenocarcinoma.  Brushings and washings were negative for malignancy.       PET/CT scan on 11/7/2018 showed mild avidity in the left hilar nodule that presumably is the primary tumor, mild associated atelectasis with no mediastinal adenopathy and no distant metastatic lesion.  There was a low avidity left renal nodule raising concern for renal cell carcinoma or a low-grade neoplasm.  Contrast-enhanced CT and or MRI or ultrasound was recommended.  MRI of the brain on 11/8/2018 showed no intracranial metastasis.  Patient underwent surgery on 11/14/2018 which was a left lower lobe sleeve resection.  Lymph nodes were dissected as well.  Pathology revealed a low-grade carcinoma with neuroendocrine features, pathological stage was a pT1c, N0, M0 stage IA3 low-grade carcinoma with neuroendocrine features.  The patient then  underwent imaging on 9/23/2019, there was a partially visualized 4.1 cm mass arising from the right kidney concerning for renal cell carcinoma.  A CT of the abdomen and pelvis on 9/24/2019 showed a 4.2 cm 60% exophytic lesion arising from the lower pole of the right kidney compatible with renal cell carcinoma.  No definite metastatic disease within the abdomen.     Patient underwent a biopsy on 10/10/2019 which revealed an oncocytic renal neoplasm favoring oncocytoma.  The patient then had follow-up CT of the abdomen and pelvis on 6/26/2020 at [facility] in [place], [place], this showed a heterogeneously enhancing exophytic mass off the posterior aspect of the inferior pole of the right kidney.  This measured 4.6 cm.  The spleen was enlarged at 13.5 cm in length.     The patient then underwent blood work here at [facility] on 7/7/2020 that revealed an elevated absolute lymphocyte count and lymphocyte percentage.  The rest of the CBC was unremarkable.  Peripheral blood smear reviewed by pathology showed several reactive lymphocytes and few atypical lymphocytes with moderate smudge cells present.  He was then seen at [facility] on 8/6/2020 and it was recommended that he follow-up locally for lymphocytosis and splenomegaly.  Repeat CBC on 8/25/2020 showed persistent lymphocytosis, repeat peripheral smear evaluation at that time showed an absolute and relative lymphocytosis with reactive lymphocytes, few atypical appearing lymphocytes, and moderate smudge cells present.      He presented to see me initially on 9/14/2020.  CBC on that day revealed a white blood cell count of 8.7, hemoglobin of 16, hematocrit of 47.5, platelet count of 199,000, MCV of 89 with a lymphocyte percent of 49.1 and an absolute lymphocyte count of 4.3.  He stated at that visit that he felt great, he denied any weight loss, fever, chills, night sweats, headaches, chest pain, shortness of breath, swollen lymph nodes, abdominal pain,  nausea, vomiting, constipation, diarrhea, melena, bright red blood per rectum, seizures, or any other major issues.     Further evaluation on 9/14/2020 revealed a CLL FISH panel that was negative for 11q deletion, trisomy 12, 13q deletion, or 17p deletion.  Flow cytometry showed a B-cell lineage that was CD19 positive, CD20 positive restricted to lambda light chains with coexpression of HLA-DR, CD5, and CD22.  It was negative for  and CD11c.  The absolute lymphocyte count was 4.3×10E3/uL, most consistent with a diagnosis of monoclonal B-cell lymphocytosis.     Chest x-ray and CT scan of the abdomen been done regularly at [facility].  Most recent chest x-ray on 01/31/2024 and CT scan of the abdomen and pelvis on 02/01/2024 showed no evidence of disease.      Interval History:   Patient here for follow up. He continues to do well overall. He continues to follow up with [facility] and continues on surveillance of renal oncocytoma.  He denies any fevers, fatigue, NS, unintentional weight loss.      Past Medical History:   Diagnosis Date    Carcinoid tumor of lung     Diverticulosis     Gastroesophageal reflux disease     History of lobectomy of lung     History of malignant neoplasm of lower lobe bronchus or lung     Male erectile dysfunction, unspecified     Monoclonal B-cell lymphocytosis     Other seasonal allergic rhinitis     Pneumothorax, unspecified     Postoperative nausea and vomiting     Prostate cancer     Renal oncocytoma of right kidney     Tinnitus, unspecified ear     Unspecified sensorineural hearing loss       Past Surgical History:   Procedure Laterality Date    BRONCHOSCOPY  10/18/2018    COLONOSCOPY  05/27/2016    Flavio Riley MD    Left Lung Lombectomy  Left     PROSTATECTOMY      SALIVARY GLAND REMOVED      TRIGGER FINGER RELEASE Right 10/2021     Social History     Socioeconomic History    Marital status:      Spouse name: Padmaja    Number of children: 2   Occupational History     Occupation: Retired   Tobacco Use    Smoking status: Never    Smokeless tobacco: Never   Substance and Sexual Activity    Alcohol use: Yes     Comment: 1-2 per month    Drug use: Never    Sexual activity: Not Currently     Partners: Female      Family History   Problem Relation Age of Onset    Hodgkin's lymphoma Mother         Non-Hodgkins    Diabetes Mellitus Father         type 2    Heart failure Father     Hypertension Sister       Review of patient's allergies indicates:   Allergen Reactions    Docusate      Other reaction(s): muscle contractions    Docusate sodium      Other reaction(s): muscle contractions    Sulfa (sulfonamide antibiotics)      Other reaction(s): migraine      Review of Systems   Constitutional:  Negative for chills, diaphoresis, fatigue, fever and unexpected weight change.   HENT:  Negative for nasal congestion, mouth sores, sinus pressure/congestion and sore throat.    Eyes:  Negative for pain and visual disturbance.   Respiratory:  Negative for cough, chest tightness and shortness of breath.    Cardiovascular:  Negative for chest pain, palpitations and leg swelling.   Gastrointestinal:  Negative for abdominal distention, abdominal pain, blood in stool, constipation and diarrhea.   Genitourinary:  Negative for dysuria, frequency and hematuria.   Musculoskeletal:  Negative for arthralgias and back pain.   Integumentary:  Negative for rash.   Neurological:  Negative for dizziness, weakness, numbness and headaches.   Hematological:  Negative for adenopathy.   Psychiatric/Behavioral:  Negative for confusion.          Objective:      Physical Exam  Vitals reviewed.   Constitutional:       General: He is awake.      Appearance: Normal appearance.   HENT:      Head: Normocephalic and atraumatic.      Right Ear: Hearing normal.      Left Ear: Hearing normal.      Nose: Nose normal.   Eyes:      General: Lids are normal. Vision grossly intact.      Extraocular Movements: Extraocular movements  intact.      Conjunctiva/sclera: Conjunctivae normal.   Cardiovascular:      Rate and Rhythm: Normal rate and regular rhythm.      Pulses: Normal pulses.      Heart sounds: Normal heart sounds.   Pulmonary:      Effort: Pulmonary effort is normal.      Breath sounds: Normal breath sounds. No wheezing, rhonchi or rales.   Abdominal:      General: Bowel sounds are normal.      Palpations: Abdomen is soft.      Tenderness: There is no abdominal tenderness.   Musculoskeletal:      Cervical back: Full passive range of motion without pain.      Right lower leg: No edema.      Left lower leg: No edema.   Lymphadenopathy:      Cervical: No cervical adenopathy.      Upper Body:      Right upper body: No supraclavicular or axillary adenopathy.      Left upper body: No supraclavicular or axillary adenopathy.   Skin:     General: Skin is warm.   Neurological:      General: No focal deficit present.      Mental Status: He is alert and oriented to person, place, and time.   Psychiatric:         Attention and Perception: Attention normal.         Mood and Affect: Mood and affect normal.         Behavior: Behavior is cooperative.         LABS AND IMAGING REVIEWED IN EPIC          Assessment:     1.  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  3.  Renal oncocytoma         Plan:       Patient has a diagnosis of monoclonal B-cell lymphocytosis.  CLL FISH panel was negative.     If/when the patient does progress to CLL, we will check an IgHV mutation.     Continue observation for B cell lymphocytosis, stable at this time     As for his low-grade carcinoma of his lung and renal oncocytoma, he will continue to follow at [facility]-- Next appointment is in September 2024.    RTC in 6 months to review labs.     Labs prior: CBC, CMP    Jet Slaughter II, MD SO, Inés Allen LPN, acted solely as a scribe for and in the presence of, Dr. Jet Slaughter who performed the  service.

## 2025-07-02 PROBLEM — N18.31 STAGE 3A CHRONIC KIDNEY DISEASE: Status: ACTIVE | Noted: 2025-07-02

## 2025-07-02 PROCEDURE — 84156 ASSAY OF PROTEIN URINE: CPT | Performed by: FAMILY MEDICINE

## 2025-07-31 DIAGNOSIS — C7A.090 MALIGNANT CARCINOID TUMOR OF LUNG: Primary | ICD-10-CM

## 2025-08-07 ENCOUNTER — LAB VISIT (OUTPATIENT)
Dept: LAB | Facility: HOSPITAL | Age: 80
End: 2025-08-07
Attending: INTERNAL MEDICINE
Payer: MEDICARE

## 2025-08-07 ENCOUNTER — OFFICE VISIT (OUTPATIENT)
Dept: HEMATOLOGY/ONCOLOGY | Facility: CLINIC | Age: 80
End: 2025-08-07
Payer: MEDICARE

## 2025-08-07 VITALS
TEMPERATURE: 98 F | RESPIRATION RATE: 18 BRPM | HEIGHT: 71 IN | SYSTOLIC BLOOD PRESSURE: 137 MMHG | DIASTOLIC BLOOD PRESSURE: 88 MMHG | BODY MASS INDEX: 23.84 KG/M2 | OXYGEN SATURATION: 95 % | HEART RATE: 58 BPM | WEIGHT: 170.31 LBS

## 2025-08-07 DIAGNOSIS — C7A.090 MALIGNANT CARCINOID TUMOR OF LUNG: ICD-10-CM

## 2025-08-07 DIAGNOSIS — D72.820 MONOCLONAL B-CELL LYMPHOCYTOSIS: Primary | ICD-10-CM

## 2025-08-07 DIAGNOSIS — D30.01 RENAL ONCOCYTOMA OF RIGHT KIDNEY: ICD-10-CM

## 2025-08-07 LAB
ALBUMIN SERPL-MCNC: 4 G/DL (ref 3.4–4.8)
ALBUMIN/GLOB SERPL: 1.4 RATIO (ref 1.1–2)
ALP SERPL-CCNC: 69 UNIT/L (ref 40–150)
ALT SERPL-CCNC: 21 UNIT/L (ref 0–55)
ANION GAP SERPL CALC-SCNC: 11 MEQ/L
AST SERPL-CCNC: 28 UNIT/L (ref 11–45)
BASOPHILS # BLD AUTO: 0.04 X10(3)/MCL
BASOPHILS NFR BLD AUTO: 0.5 %
BILIRUB SERPL-MCNC: 0.6 MG/DL
BUN SERPL-MCNC: 14.9 MG/DL (ref 8.4–25.7)
CALCIUM SERPL-MCNC: 9 MG/DL (ref 8.8–10)
CHLORIDE SERPL-SCNC: 110 MMOL/L (ref 98–107)
CO2 SERPL-SCNC: 22 MMOL/L (ref 23–31)
CREAT SERPL-MCNC: 1.37 MG/DL (ref 0.72–1.25)
CREAT/UREA NIT SERPL: 11
EOSINOPHIL # BLD AUTO: 0.18 X10(3)/MCL (ref 0–0.9)
EOSINOPHIL NFR BLD AUTO: 2.3 %
ERYTHROCYTE [DISTWIDTH] IN BLOOD BY AUTOMATED COUNT: 12.8 % (ref 11.5–17)
GFR SERPLBLD CREATININE-BSD FMLA CKD-EPI: 52 ML/MIN/1.73/M2
GLOBULIN SER-MCNC: 2.8 GM/DL (ref 2.4–3.5)
GLUCOSE SERPL-MCNC: 87 MG/DL (ref 82–115)
HCT VFR BLD AUTO: 47.1 % (ref 42–52)
HGB BLD-MCNC: 15.7 G/DL (ref 14–18)
IMM GRANULOCYTES # BLD AUTO: 0.04 X10(3)/MCL (ref 0–0.04)
IMM GRANULOCYTES NFR BLD AUTO: 0.5 %
LDH SERPL-CCNC: 373 U/L (ref 125–220)
LYMPHOCYTES # BLD AUTO: 4.05 X10(3)/MCL (ref 0.6–4.6)
LYMPHOCYTES NFR BLD AUTO: 50.9 %
MCH RBC QN AUTO: 29.5 PG (ref 27–31)
MCHC RBC AUTO-ENTMCNC: 33.3 G/DL (ref 33–36)
MCV RBC AUTO: 88.4 FL (ref 80–94)
MONOCYTES # BLD AUTO: 0.56 X10(3)/MCL (ref 0.1–1.3)
MONOCYTES NFR BLD AUTO: 7 %
NEUTROPHILS # BLD AUTO: 3.08 X10(3)/MCL (ref 2.1–9.2)
NEUTROPHILS NFR BLD AUTO: 38.8 %
PLATELET # BLD AUTO: 226 X10(3)/MCL (ref 130–400)
PMV BLD AUTO: 9.9 FL (ref 7.4–10.4)
POTASSIUM SERPL-SCNC: 4.1 MMOL/L (ref 3.5–5.1)
PROT SERPL-MCNC: 6.8 GM/DL (ref 5.8–7.6)
RBC # BLD AUTO: 5.33 X10(6)/MCL (ref 4.7–6.1)
SODIUM SERPL-SCNC: 143 MMOL/L (ref 136–145)
WBC # BLD AUTO: 7.95 X10(3)/MCL (ref 4.5–11.5)

## 2025-08-07 PROCEDURE — 80053 COMPREHEN METABOLIC PANEL: CPT

## 2025-08-07 PROCEDURE — G2211 COMPLEX E/M VISIT ADD ON: HCPCS | Mod: ,,, | Performed by: NURSE PRACTITIONER

## 2025-08-07 PROCEDURE — 36415 COLL VENOUS BLD VENIPUNCTURE: CPT

## 2025-08-07 PROCEDURE — 99999 PR PBB SHADOW E&M-EST. PATIENT-LVL III: CPT | Mod: PBBFAC,,, | Performed by: NURSE PRACTITIONER

## 2025-08-07 PROCEDURE — 83615 LACTATE (LD) (LDH) ENZYME: CPT

## 2025-08-07 PROCEDURE — 85025 COMPLETE CBC W/AUTO DIFF WBC: CPT

## 2025-08-07 PROCEDURE — 99214 OFFICE O/P EST MOD 30 MIN: CPT | Mod: S$PBB,,, | Performed by: NURSE PRACTITIONER

## 2025-08-07 PROCEDURE — 99213 OFFICE O/P EST LOW 20 MIN: CPT | Mod: PBBFAC | Performed by: NURSE PRACTITIONER

## 2025-08-07 NOTE — PROGRESS NOTES
Subjective:       Patient ID: Robert Paniagua is a 80 y.o. male.    Chief Complaint: 6 Month Follow Up        Diagnosis:  1.  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  3.  Renal oncocytoma    Current Treatment:   Observation for monoclonal B-cell lymphocytosis.    Treatment History:  N/A    HPI:  Patient presented in December 2017 with hemoptysis at [facility].  A chest x-ray at that time was negative.  He then developed hemoptysis again in August 2018 and a chest x-ray revealed increasing interstitial markings in the left lower hilar region with possible abnormal enlargement in the left hilum.  A CT of the chest on 10/9/2018 without contrast showed an irregular endobronchial lesion in the left main bronchus to the left lower lobe takeoff.  It measured 1.9 cm.  There was atelectasis involving the anterior medial segment of the left lower lobe.  There was no focal pulmonary nodule.  There were few scattered nonenlarged mediastinal lymph nodes.  Bronchoscopy on 10/24/2018 with a biopsy of the lesion in the left mainstem revealed adenocarcinoma.  Brushings and washings were negative for malignancy.       PET/CT scan on 11/7/2018 showed mild avidity in the left hilar nodule that presumably is the primary tumor, mild associated atelectasis with no mediastinal adenopathy and no distant metastatic lesion.  There was a low avidity left renal nodule raising concern for renal cell carcinoma or a low-grade neoplasm.  Contrast-enhanced CT and or MRI or ultrasound was recommended.  MRI of the brain on 11/8/2018 showed no intracranial metastasis.  Patient underwent surgery on 11/14/2018 which was a left lower lobe sleeve resection.  Lymph nodes were dissected as well.  Pathology revealed a low-grade carcinoma with neuroendocrine features, pathological stage was a pT1c, N0, M0 stage IA3 low-grade carcinoma with neuroendocrine features.  The patient then underwent imaging on  9/23/2019, there was a partially visualized 4.1 cm mass arising from the right kidney concerning for renal cell carcinoma.  A CT of the abdomen and pelvis on 9/24/2019 showed a 4.2 cm 60% exophytic lesion arising from the lower pole of the right kidney compatible with renal cell carcinoma.  No definite metastatic disease within the abdomen.     Patient underwent a biopsy on 10/10/2019 which revealed an oncocytic renal neoplasm favoring oncocytoma.  The patient then had follow-up CT of the abdomen and pelvis on 6/26/2020 at [facility] in [place], [place], this showed a heterogeneously enhancing exophytic mass off the posterior aspect of the inferior pole of the right kidney.  This measured 4.6 cm.  The spleen was enlarged at 13.5 cm in length.     The patient then underwent blood work here at [facility] on 7/7/2020 that revealed an elevated absolute lymphocyte count and lymphocyte percentage.  The rest of the CBC was unremarkable.  Peripheral blood smear reviewed by pathology showed several reactive lymphocytes and few atypical lymphocytes with moderate smudge cells present.  He was then seen at [facility] on 8/6/2020 and it was recommended that he follow-up locally for lymphocytosis and splenomegaly.  Repeat CBC on 8/25/2020 showed persistent lymphocytosis, repeat peripheral smear evaluation at that time showed an absolute and relative lymphocytosis with reactive lymphocytes, few atypical appearing lymphocytes, and moderate smudge cells present.      He presented to see me initially on 9/14/2020.  CBC on that day revealed a white blood cell count of 8.7, hemoglobin of 16, hematocrit of 47.5, platelet count of 199,000, MCV of 89 with a lymphocyte percent of 49.1 and an absolute lymphocyte count of 4.3.  He stated at that visit that he felt great, he denied any weight loss, fever, chills, night sweats, headaches, chest pain, shortness of breath, swollen lymph nodes, abdominal pain, nausea, vomiting,  constipation, diarrhea, melena, bright red blood per rectum, seizures, or any other major issues.     Further evaluation on 9/14/2020 revealed a CLL FISH panel that was negative for 11q deletion, trisomy 12, 13q deletion, or 17p deletion.  Flow cytometry showed a B-cell lineage that was CD19 positive, CD20 positive restricted to lambda light chains with coexpression of HLA-DR, CD5, and CD22.  It was negative for  and CD11c.  The absolute lymphocyte count was 4.3×10E3/uL, most consistent with a diagnosis of monoclonal B-cell lymphocytosis.     Chest x-ray and CT scan of the abdomen been done regularly at [facility].  Most recent chest x-ray on 01/31/2024 and CT scan of the abdomen and pelvis on 02/01/2024 showed no evidence of disease.      Interval History:   Patient here for follow up for monoclonal B-cell lymphocytosis. He continues to do well overall. He continues to follow up with [facility] and continues on surveillance of renal oncocytoma. History of lung cancer s/p lobectomy and prostate cancer. He denies any fevers, fatigue, NS, unintentional weight loss.      Past Medical History:   Diagnosis Date    Carcinoid tumor of lung     Diverticulosis     Gastroesophageal reflux disease     History of lobectomy of lung     History of malignant neoplasm of lower lobe bronchus or lung     Male erectile dysfunction, unspecified     Monoclonal B-cell lymphocytosis     Other seasonal allergic rhinitis     Pneumothorax, unspecified     Postoperative nausea and vomiting     Prostate cancer     Renal oncocytoma of right kidney     Tinnitus, unspecified ear     Unspecified sensorineural hearing loss       Past Surgical History:   Procedure Laterality Date    BRONCHOSCOPY  10/18/2018    COLONOSCOPY  05/27/2016    Flavio Riley MD    Left Lung Lombectomy  Left     PROSTATECTOMY      SALIVARY GLAND REMOVED      TRIGGER FINGER RELEASE Right 10/2021     Social History     Socioeconomic History    Marital status:       Spouse name: Padmaja    Number of children: 2   Occupational History    Occupation: Retired   Tobacco Use    Smoking status: Never    Smokeless tobacco: Never   Substance and Sexual Activity    Alcohol use: Yes     Comment: 1-2 per month    Drug use: Never    Sexual activity: Not Currently     Partners: Female      Family History   Problem Relation Name Age of Onset    Hodgkin's lymphoma Mother          Non-Hodgkins    Diabetes Mellitus Father          type 2    Heart failure Father      Hypertension Sister        Review of patient's allergies indicates:   Allergen Reactions    Docusate      Other reaction(s): muscle contractions    Docusate sodium      Other reaction(s): muscle contractions    Sulfa (sulfonamide antibiotics)      Other reaction(s): migraine      Review of Systems   Constitutional:  Negative for chills, diaphoresis, fatigue, fever and unexpected weight change.   HENT:  Negative for nasal congestion, mouth sores, sinus pressure/congestion and sore throat.    Eyes:  Negative for pain and visual disturbance.   Respiratory:  Negative for cough, chest tightness and shortness of breath.    Cardiovascular:  Negative for chest pain, palpitations and leg swelling.   Gastrointestinal:  Negative for abdominal distention, abdominal pain, blood in stool, constipation and diarrhea.   Genitourinary:  Negative for dysuria, frequency and hematuria.   Musculoskeletal:  Negative for arthralgias and back pain.   Integumentary:  Negative for rash.   Neurological:  Negative for dizziness, weakness, numbness and headaches.   Hematological:  Negative for adenopathy.   Psychiatric/Behavioral:  Negative for confusion.          Objective:      Physical Exam  Vitals reviewed.   Constitutional:       General: He is awake.      Appearance: Normal appearance.   HENT:      Head: Normocephalic and atraumatic.      Right Ear: Hearing normal.      Left Ear: Hearing normal.      Nose: Nose normal.   Eyes:      General: Lids are normal.  Vision grossly intact.      Extraocular Movements: Extraocular movements intact.      Conjunctiva/sclera: Conjunctivae normal.   Cardiovascular:      Rate and Rhythm: Normal rate and regular rhythm.      Pulses: Normal pulses.      Heart sounds: Normal heart sounds.   Pulmonary:      Effort: Pulmonary effort is normal.      Breath sounds: Normal breath sounds. No wheezing, rhonchi or rales.   Abdominal:      General: Bowel sounds are normal.      Palpations: Abdomen is soft.      Tenderness: There is no abdominal tenderness.   Musculoskeletal:      Cervical back: Full passive range of motion without pain.      Right lower leg: No edema.      Left lower leg: No edema.   Lymphadenopathy:      Cervical: No cervical adenopathy.      Upper Body:      Right upper body: No supraclavicular or axillary adenopathy.      Left upper body: No supraclavicular or axillary adenopathy.   Skin:     General: Skin is warm.   Neurological:      General: No focal deficit present.      Mental Status: He is alert and oriented to person, place, and time.   Psychiatric:         Attention and Perception: Attention normal.         Mood and Affect: Mood and affect normal.         Behavior: Behavior is cooperative.         LABS AND IMAGING REVIEWED IN EPIC          Assessment:     1.  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  3.  Renal oncocytoma         Plan:       Patient has a diagnosis of monoclonal B-cell lymphocytosis.  CLL FISH panel was negative.     If/when the patient does progress to CLL, we will check an IgHV mutation.     Continue observation for B cell lymphocytosis, stable at this time     As for his low-grade carcinoma of his lung and renal oncocytoma, he will continue to follow at [facility]-- Next appointment is in February 2026.    RTC in 6 months to review labs.     Labs prior: CBC, CMP       PEDRO Vasquez       - code applied: patient requires or  will require a continuous, longitudinal and active collaborative plan of care related to this patient's health condition monoclonal B-cell lymphocytosis, --- the management of which requires the direction of a practitioner with specialized clinical knowledge, skill and expertise.